# Patient Record
Sex: FEMALE | Race: WHITE | NOT HISPANIC OR LATINO | Employment: FULL TIME | URBAN - METROPOLITAN AREA
[De-identification: names, ages, dates, MRNs, and addresses within clinical notes are randomized per-mention and may not be internally consistent; named-entity substitution may affect disease eponyms.]

---

## 2021-03-24 ENCOUNTER — TRANSFERRED RECORDS (OUTPATIENT)
Dept: HEALTH INFORMATION MANAGEMENT | Facility: CLINIC | Age: 21
End: 2021-03-24

## 2022-02-16 ENCOUNTER — OFFICE VISIT (OUTPATIENT)
Dept: FAMILY MEDICINE | Facility: CLINIC | Age: 22
End: 2022-02-16
Payer: COMMERCIAL

## 2022-02-16 VITALS
RESPIRATION RATE: 14 BRPM | SYSTOLIC BLOOD PRESSURE: 115 MMHG | DIASTOLIC BLOOD PRESSURE: 77 MMHG | HEIGHT: 62 IN | HEART RATE: 88 BPM | WEIGHT: 98 LBS | BODY MASS INDEX: 18.03 KG/M2 | OXYGEN SATURATION: 100 % | TEMPERATURE: 97.6 F

## 2022-02-16 DIAGNOSIS — Z11.3 SCREENING FOR STDS (SEXUALLY TRANSMITTED DISEASES): ICD-10-CM

## 2022-02-16 DIAGNOSIS — N39.0 URINARY TRACT INFECTION WITH HEMATURIA, SITE UNSPECIFIED: Primary | ICD-10-CM

## 2022-02-16 DIAGNOSIS — B96.89 BV (BACTERIAL VAGINOSIS): ICD-10-CM

## 2022-02-16 DIAGNOSIS — N89.8 VAGINAL DISCHARGE: ICD-10-CM

## 2022-02-16 DIAGNOSIS — R31.9 URINARY TRACT INFECTION WITH HEMATURIA, SITE UNSPECIFIED: Primary | ICD-10-CM

## 2022-02-16 DIAGNOSIS — N76.0 BV (BACTERIAL VAGINOSIS): ICD-10-CM

## 2022-02-16 DIAGNOSIS — R10.2 PELVIC PAIN IN FEMALE: ICD-10-CM

## 2022-02-16 LAB
ALBUMIN UR-MCNC: 30 MG/DL
APPEARANCE UR: ABNORMAL
BACTERIA #/AREA URNS HPF: ABNORMAL /HPF
BILIRUB UR QL STRIP: NEGATIVE
CLUE CELLS: PRESENT
COLOR UR AUTO: YELLOW
GLUCOSE UR STRIP-MCNC: NEGATIVE MG/DL
HGB UR QL STRIP: ABNORMAL
KETONES UR STRIP-MCNC: ABNORMAL MG/DL
LEUKOCYTE ESTERASE UR QL STRIP: ABNORMAL
MUCOUS THREADS #/AREA URNS LPF: PRESENT /LPF
NITRATE UR QL: NEGATIVE
PH UR STRIP: 6.5 [PH] (ref 5–7)
RBC #/AREA URNS AUTO: ABNORMAL /HPF
SP GR UR STRIP: 1.02 (ref 1–1.03)
SQUAMOUS #/AREA URNS AUTO: ABNORMAL /LPF
TRICHOMONAS, WET PREP: ABNORMAL
UROBILINOGEN UR STRIP-ACNC: 1 E.U./DL
WBC #/AREA URNS AUTO: ABNORMAL /HPF
WBC'S/HIGH POWER FIELD, WET PREP: ABNORMAL
YEAST, WET PREP: ABNORMAL

## 2022-02-16 PROCEDURE — 81001 URINALYSIS AUTO W/SCOPE: CPT | Performed by: INTERNAL MEDICINE

## 2022-02-16 PROCEDURE — 87210 SMEAR WET MOUNT SALINE/INK: CPT | Performed by: INTERNAL MEDICINE

## 2022-02-16 PROCEDURE — 87491 CHLMYD TRACH DNA AMP PROBE: CPT | Performed by: INTERNAL MEDICINE

## 2022-02-16 PROCEDURE — 87591 N.GONORRHOEAE DNA AMP PROB: CPT | Performed by: INTERNAL MEDICINE

## 2022-02-16 PROCEDURE — 99204 OFFICE O/P NEW MOD 45 MIN: CPT | Performed by: INTERNAL MEDICINE

## 2022-02-16 PROCEDURE — 87086 URINE CULTURE/COLONY COUNT: CPT | Performed by: INTERNAL MEDICINE

## 2022-02-16 RX ORDER — METRONIDAZOLE 500 MG/1
500 TABLET ORAL 2 TIMES DAILY
Qty: 14 TABLET | Refills: 0 | Status: SHIPPED | OUTPATIENT
Start: 2022-02-16 | End: 2022-02-23

## 2022-02-16 RX ORDER — SULFAMETHOXAZOLE/TRIMETHOPRIM 800-160 MG
1 TABLET ORAL 2 TIMES DAILY
Qty: 14 TABLET | Refills: 0 | Status: SHIPPED | OUTPATIENT
Start: 2022-02-16 | End: 2022-09-15

## 2022-02-16 RX ORDER — NORGESTIMATE AND ETHINYL ESTRADIOL 7DAYSX3 LO
1 KIT ORAL DAILY
COMMUNITY
End: 2023-06-20

## 2022-02-16 ASSESSMENT — PAIN SCALES - GENERAL: PAINLEVEL: NO PAIN (0)

## 2022-02-16 ASSESSMENT — ENCOUNTER SYMPTOMS: ABDOMINAL PAIN: 1

## 2022-02-16 NOTE — PROGRESS NOTES
"Assessment and Plan  1. Pelvic pain in female  2. Urinary tract infection with hematuria, site unspecified  Ongoing problem of pelvic pain , Uncontrolled.  Intermittent sharp 8/10 sveerity 4x/ day - Mostly on left side but did have tenderness on suprapubic region on my exam today.. Pt is relocating from [place] and did have similar episode last year - Abdominal pain in 4/2021 when she had CT abdomen at ER there said her Uterus was abnormal shape as\"  heart shaped\"  Which is possibly \" Bicornuate Uterus \" per my knowledge. She states they Checked labs which was normal , with mild high WBC at that time. JAIEDN signed and will get the records.   - I do not suspect pregnancy as her LMP- 2/2/2022; On OCP since 14 yrs age which she religiously takes daily and patient denies any pregnancy at this time.   - Please see AVS for detaills before I send the symptomatic treatment of antispasmodic to her pharmacy. Pt understood and agreed with the plan.   UPDATE - Called and updated pt that her UA positive for infection and sent in antibiotic to her pharmacy. Will await for Urine culture ordered.   - UA with Microscopic reflex to Culture - lab collect; Future  - UA with Microscopic reflex to Culture - lab collect  - sulfamethoxazole-trimethoprim (BACTRIM DS) 800-160 MG tablet; Take 1 tablet by mouth 2 times daily  Dispense: 14 tablet; Refill: 0  - Urine Culture    2. Vaginal discharge  3. BV (bacterial vaginosis)  New problem, Wet prep positive for BV . Sent in medication to pharmacy.   - metroNIDAZOLE (FLAGYL) 500 MG tablet; Take 1 tablet (500 mg) by mouth 2 times daily for 7 days  Dispense: 14 tablet; Refill: 0  - Wet prep - lab collect; Future  - Wet prep - lab collect    3. Screening for STDs (sexually transmitted diseases)  - NEISSERIA GONORRHOEA PCR; Future  - CHLAMYDIA TRACHOMATIS PCR; Future  - NEISSERIA GONORRHOEA PCR  - CHLAMYDIA TRACHOMATIS PCR       Patient Instructions   As discussed, will check Urinalysis which if positive " for UTI and treat.     Will get your ER records from [place] before next steps.     ==================================    Patient Education     Pelvic Pain, Uncertain Cause    Pelvic pain is pain felt in the lowest part of the belly (abdomen) and between the hipbones. The pain may occur suddenly and recently (acute). Or the pain may last for 6 months or longer (chronic).   There are many possible causes of pelvic pain. The pain may be due to a problem in the female reproductive system. Or it may be due to a problem in the digestive, urinary, or musculoskeletal systems.   Based on your visit today, the exact cause of your pelvic pain is not certain. Your condition doesn't seem to be serious at this time. But it is important for you to keep watching for any new symptoms or worsening of your condition.   General care  Your healthcare provider may advise a number of ways to help manage your pain. These can include:     Taking over-the-counter pain medicine. Stronger pain medicine may also be prescribed, if needed.    Applying heat to the pelvic area. Use a heating pad or a hot pack. Taking a hot bath may also help.    Getting plenty of rest.    Making certain lifestyle changes. These can include practicing good posture and getting regular exercise. Studies have shown that these changes help reduce pelvic pain in some women.    Seeing a physical therapist or pain specialist. These healthcare providers can discuss other ways to manage pain with you.    Acupressure or acupuncture.  Follow-up care  Follow up with your healthcare provider, or as advised.    When to get medical advice  Call your healthcare provider right away if any of the following occur:     Fever of 100.4 F or higher, or as directed by your healthcare provider    Pain gets worse or you have sudden, severe pain or new pain    Nausea, vomiting, sweating, or restlessness    Dizziness or fainting    Abnormal vaginal discharge    Abnormal vaginal bleeding  (especially bleeding after menopause)  [employer] last reviewed this educational content on 6/1/2020 2000-2021 [employer]. All rights reserved. This information is not intended as a substitute for professional medical care. Always follow your healthcare professional's instructions.             Return in about 4 weeks (around 3/16/2022), or if symptoms worsen or fail to improve, for Preventative Visit , PAP Marcella Melendez MD  [facility] LUZ Ibanez is a 21 year old who presents for the following health issues       Abdominal Pain     History of Present Illness     Reason for visit:  Stomach pain  Symptom onset:  1-3 days ago  Symptoms include:  Pain in lower abdomen  Symptom intensity:  Moderate  Symptom progression:  Staying the same  Had these symptoms before:  Yes  Has tried/received treatment for these symptoms:  Yes  Previous treatment was successful:  No  What makes it worse:  Eating, walking  What makes it better:  Laying down    She eats 0-1 servings of fruits and vegetables daily.She consumes 0 sweetened beverage(s) daily.She exercises with enough effort to increase her heart rate 30 to 60 minutes per day.  She exercises with enough effort to increase her heart rate 3 or less days per week.   She is taking medications regularly.          Allergies   Allergen Reactions     Cefazolin Hives        History reviewed. No pertinent past medical history.    No past surgical history on file.    History reviewed. No pertinent family history.    Social History     Tobacco Use     Smoking status: Never Smoker     Smokeless tobacco: Never Used   Substance Use Topics     Alcohol use: Yes     Comment: 1x week        Current Outpatient Medications   Medication     norgestim-eth estrad triphasic (ORTHO TRI-CYCLEN LO) 0.18/0.215/0.25 MG-25 MCG tablet     No current facility-administered medications for this visit.        Review of Systems   Gastrointestinal:  "Positive for abdominal pain.      Constitutional, HEENT, cardiovascular, pulmonary, GI, , musculoskeletal, neuro, skin, endocrine and psych systems are negative, except as otherwise noted.      Objective    /77   Pulse 88   Temp 97.6  F (36.4  C) (Tympanic)   Resp 14   Ht 1.575 m (5' 2\")   Wt 44.5 kg (98 lb)   SpO2 100%   BMI 17.92 kg/m    Body mass index is 17.92 kg/m .  Physical Exam   GENERAL: healthy, alert and no distress  NECK: no adenopathy, no asymmetry, masses, or scars and thyroid normal to palpation  RESP: lungs clear to auscultation - no rales, rhonchi or wheezes  CV: regular rate and rhythm, normal S1 S2, no S3 or S4, no murmur, click or rub, no peripheral edema and peripheral pulses strong  ABDOMEN: soft, POSITIVE for suprapubic tenderness though she shows on and off subjective pains in left pelvic region , no hepatosplenomegaly, no masses and bowel sounds normal  MS: no gross musculoskeletal defects noted, no edema      "

## 2022-02-16 NOTE — PATIENT INSTRUCTIONS
As discussed, will check Urinalysis which if positive for UTI and treat.     Will get your ER records from [place] before next steps.     ==================================    Patient Education     Pelvic Pain, Uncertain Cause    Pelvic pain is pain felt in the lowest part of the belly (abdomen) and between the hipbones. The pain may occur suddenly and recently (acute). Or the pain may last for 6 months or longer (chronic).   There are many possible causes of pelvic pain. The pain may be due to a problem in the female reproductive system. Or it may be due to a problem in the digestive, urinary, or musculoskeletal systems.   Based on your visit today, the exact cause of your pelvic pain is not certain. Your condition doesn't seem to be serious at this time. But it is important for you to keep watching for any new symptoms or worsening of your condition.   General care  Your healthcare provider may advise a number of ways to help manage your pain. These can include:     Taking over-the-counter pain medicine. Stronger pain medicine may also be prescribed, if needed.    Applying heat to the pelvic area. Use a heating pad or a hot pack. Taking a hot bath may also help.    Getting plenty of rest.    Making certain lifestyle changes. These can include practicing good posture and getting regular exercise. Studies have shown that these changes help reduce pelvic pain in some women.    Seeing a physical therapist or pain specialist. These healthcare providers can discuss other ways to manage pain with you.    Acupressure or acupuncture.  Follow-up care  Follow up with your healthcare provider, or as advised.    When to get medical advice  Call your healthcare provider right away if any of the following occur:     Fever of 100.4 F or higher, or as directed by your healthcare provider    Pain gets worse or you have sudden, severe pain or new pain    Nausea, vomiting, sweating, or restlessness    Dizziness or fainting    Abnormal  vaginal discharge    Abnormal vaginal bleeding (especially bleeding after menopause)  [employer] last reviewed this educational content on 6/1/2020 2000-2021 [employer]. All rights reserved. This information is not intended as a substitute for professional medical care. Always follow your healthcare professional's instructions.

## 2022-02-17 ENCOUNTER — TELEPHONE (OUTPATIENT)
Dept: FAMILY MEDICINE | Facility: CLINIC | Age: 22
End: 2022-02-17
Payer: COMMERCIAL

## 2022-02-17 NOTE — RESULT ENCOUNTER NOTE
"Called patient and notified, Updated my progress notes. NO ACTION NEEDED..    Your Urine exam is positive for UTI. I have sent in antibiotic to your pharmacy. Please start it off ASAP. I will update on your culture results which takes few days. Please let me know if you have any questions.    Your Vaginal swab is positive for Bacterial Vaginosis, which is NOT a sexually transmitted disease. I have sent in antibiotic to your pharmacy. Please avoid alcohol while you are on this medication. You can find additional information regarding this condition as below. Let me know if you have any questions.   Mayra Melendez MD on 2021 at 6:55 PM    =================      Patient Education     Bacterial Vaginosis    You have a vaginal infection called bacterial vaginosis (BV). Both good and bad bacteria are present in a healthy vagina. BV occurs when these bacteria get out of balance. The number of bad bacteria increase. And the number of good bacteria decrease. BV is linked with sexual activity, but it's not a sexually transmitted infection (STI).   BV may or may not cause symptoms. If symptoms do occur, they can include:   Thin, gray, milky-white, or sometimes green discharge  Unpleasant odor or \"fishy\" smell  Itching, burning, or pain in or around the vagina  It is not known what causes BV, but certain factors can make the problem more likely. These can include:   Douching  Spermicides  Use of antibiotics  Change in hormone levels with pregnancy, breastfeeding, or menopause  Having sex with a new partner  Having sex with more than one partner  BV will sometimes go away on its own. But treatment is often advised. This is because untreated BV can raise the risk of more serious health problems such as:   Pelvic inflammatory disease (PID)   delivery (giving birth to a baby early if you're pregnant)  HIV and some other sexually transmitted infections (STIs)  Infection after surgery on the reproductive organs  Home " care  General care  BV is most often treated with medicines called antibiotics. These may be given as pills or as a vaginal cream. If antibiotics are prescribed, be sure to use them exactly as directed. And complete all of the medicine, even if your symptoms go away.  Don't douche or having sex during treatment.  If you have sex with a female partner, ask your healthcare provider if she should also be treated.  Prevention  Don't douche.  Don't have sex. If you do have sex, then take steps to lower your risk:  Use condoms when having sex.  Limit the number of sex partners you have.    Follow-up care  Follow up with your healthcare provider, or as advised.   When to get medical advice  Call your healthcare provider right away if:   You have a fever of 100.4 F (38 C) or higher, or as directed by your provider.  Your symptoms get worse, or they don't go away within a few days of starting treatment.  You have new pain in the lower belly or pelvic region.  You have side effects that bother you or a reaction to the pills or cream you're prescribed.  You or any of your sex partners have new symptoms, such as a rash, joint pain, or sores.  [employer] last reviewed this educational content on 6/1/2020 2000-2021 [employer]. All rights reserved. This information is not intended as a substitute for professional medical care. Always follow your healthcare professional's instructions.

## 2022-02-17 NOTE — TELEPHONE ENCOUNTER
"----- Message from Mayra Melendez MD sent at 2022 11:25 PM CST -----  Called patient and notified, Updated my progress notes. NO ACTION NEEDED..    Your Urine exam is positive for UTI. I have sent in antibiotic to your pharmacy. Please start it off ASAP. I will update on your culture results which takes few days. Please let me know if you have any questions.    Your Vaginal swab is positive for Bacterial Vaginosis, which is NOT a sexually transmitted disease. I have sent in antibiotic to your pharmacy. Please avoid alcohol while you are on this medication. You can find additional information regarding this condition as below. Let me know if you have any questions.   Mayra Melendez MD on 2021 at 6:55 PM    =================      Patient Education     Bacterial Vaginosis    You have a vaginal infection called bacterial vaginosis (BV). Both good and bad bacteria are present in a healthy vagina. BV occurs when these bacteria get out of balance. The number of bad bacteria increase. And the number of good bacteria decrease. BV is linked with sexual activity, but it's not a sexually transmitted infection (STI).   BV may or may not cause symptoms. If symptoms do occur, they can include:   Thin, gray, milky-white, or sometimes green discharge  Unpleasant odor or \"fishy\" smell  Itching, burning, or pain in or around the vagina  It is not known what causes BV, but certain factors can make the problem more likely. These can include:   Douching  Spermicides  Use of antibiotics  Change in hormone levels with pregnancy, breastfeeding, or menopause  Having sex with a new partner  Having sex with more than one partner  BV will sometimes go away on its own. But treatment is often advised. This is because untreated BV can raise the risk of more serious health problems such as:   Pelvic inflammatory disease (PID)   delivery (giving birth to a baby early if you're pregnant)  HIV and some other sexually transmitted " infections (STIs)  Infection after surgery on the reproductive organs  Home care  General care  BV is most often treated with medicines called antibiotics. These may be given as pills or as a vaginal cream. If antibiotics are prescribed, be sure to use them exactly as directed. And complete all of the medicine, even if your symptoms go away.  Don't douche or having sex during treatment.  If you have sex with a female partner, ask your healthcare provider if she should also be treated.  Prevention  Don't douche.  Don't have sex. If you do have sex, then take steps to lower your risk:  Use condoms when having sex.  Limit the number of sex partners you have.    Follow-up care  Follow up with your healthcare provider, or as advised.   When to get medical advice  Call your healthcare provider right away if:   You have a fever of 100.4 F (38 C) or higher, or as directed by your provider.  Your symptoms get worse, or they don't go away within a few days of starting treatment.  You have new pain in the lower belly or pelvic region.  You have side effects that bother you or a reaction to the pills or cream you're prescribed.  You or any of your sex partners have new symptoms, such as a rash, joint pain, or sores.  [employer] last reviewed this educational content on 6/1/2020 2000-2021 [employer]. All rights reserved. This information is not intended as a substitute for professional medical care. Always follow your healthcare professional's instructions.

## 2022-02-18 LAB
BACTERIA UR CULT: NORMAL
C TRACH DNA SPEC QL NAA+PROBE: NEGATIVE
N GONORRHOEA DNA SPEC QL NAA+PROBE: NEGATIVE

## 2022-04-03 ENCOUNTER — HEALTH MAINTENANCE LETTER (OUTPATIENT)
Age: 22
End: 2022-04-03

## 2022-09-15 ENCOUNTER — OFFICE VISIT (OUTPATIENT)
Dept: FAMILY MEDICINE | Facility: CLINIC | Age: 22
End: 2022-09-15
Payer: COMMERCIAL

## 2022-09-15 VITALS
DIASTOLIC BLOOD PRESSURE: 86 MMHG | HEART RATE: 101 BPM | SYSTOLIC BLOOD PRESSURE: 132 MMHG | WEIGHT: 100 LBS | BODY MASS INDEX: 18.29 KG/M2 | TEMPERATURE: 99.1 F | OXYGEN SATURATION: 97 %

## 2022-09-15 DIAGNOSIS — S01.332A COMPLICATION OF LEFT EAR PIERCING, INITIAL ENCOUNTER: Primary | ICD-10-CM

## 2022-09-15 PROCEDURE — 99213 OFFICE O/P EST LOW 20 MIN: CPT

## 2022-09-15 RX ORDER — DOXYCYCLINE 100 MG/1
100 CAPSULE ORAL 2 TIMES DAILY
Qty: 14 CAPSULE | Refills: 0 | Status: SHIPPED | OUTPATIENT
Start: 2022-09-15 | End: 2022-09-22

## 2022-09-15 ASSESSMENT — ENCOUNTER SYMPTOMS
FEVER: 0
WOUND: 1

## 2022-09-15 NOTE — PATIENT INSTRUCTIONS
Continue to irrigate the piercing with water or saline.  When the infection settles down remove the piercing.

## 2022-09-15 NOTE — PROGRESS NOTES
Assessment & Plan     Complication of left ear piercing, initial encounter    - doxycycline hyclate (VIBRAMYCIN) 100 MG capsule; Take 1 capsule (100 mg) by mouth 2 times daily for 7 days      15 minutes spent on the date of the encounter doing patient visit and documentation        See Patient Instructions    Return in about 1 week (around 9/22/2022).    [facility]  [facility]    Rosa Ibanez is a 21 year old presenting for the following health issues:  Ear Problem (Infected ear piercing, March ,behind left ear)      HPI     Infected ear piercing to the left earlobe for the past 1 to 2 weeks.  Piercing was placed 6 months ago.  No trauma to the ear.  Has been using hydrogen peroxide and water to the area.  Left earlobe piercing is swollen and draining yellow fluid.  No fevers.    Review of Systems   Constitutional: Negative for fever.   Skin: Positive for wound.            Objective    /86 (BP Location: Right arm, Patient Position: Sitting, Cuff Size: Adult Small)   Pulse 101   Temp 99.1  F (37.3  C) (Oral)   Wt 45.4 kg (100 lb)   SpO2 97%   BMI 18.29 kg/m    Body mass index is 18.29 kg/m .  Physical Exam   GENERAL: healthy, alert and no distress  SKIN: Left earlobe piercing with mild erythema and swelling.  Serous sanguinous drainage and possible new keloid to the posterior lobe

## 2022-10-03 ENCOUNTER — HEALTH MAINTENANCE LETTER (OUTPATIENT)
Age: 22
End: 2022-10-03

## 2023-04-25 ENCOUNTER — APPOINTMENT (OUTPATIENT)
Dept: URBAN - METROPOLITAN AREA CLINIC 254 | Age: 23
Setting detail: DERMATOLOGY
End: 2023-04-29

## 2023-04-25 VITALS — HEIGHT: 62 IN | WEIGHT: 99 LBS

## 2023-04-25 DIAGNOSIS — D22 MELANOCYTIC NEVI: ICD-10-CM

## 2023-04-25 PROBLEM — D22.22 MELANOCYTIC NEVI OF LEFT EAR AND EXTERNAL AURICULAR CANAL: Status: ACTIVE | Noted: 2023-04-25

## 2023-04-25 PROCEDURE — OTHER ADDITIONAL NOTES: OTHER

## 2023-04-25 PROCEDURE — 99202 OFFICE O/P NEW SF 15 MIN: CPT

## 2023-04-25 PROCEDURE — OTHER COUNSELING: OTHER

## 2023-04-25 PROCEDURE — OTHER MIPS QUALITY: OTHER

## 2023-04-25 ASSESSMENT — LOCATION DETAILED DESCRIPTION DERM: LOCATION DETAILED: LEFT POSTERIOR EAR

## 2023-04-25 ASSESSMENT — LOCATION SIMPLE DESCRIPTION DERM: LOCATION SIMPLE: LEFT EAR

## 2023-04-25 ASSESSMENT — LOCATION ZONE DERM: LOCATION ZONE: EAR

## 2023-04-25 NOTE — PROCEDURE: ADDITIONAL NOTES
Detail Level: Simple
Render Risk Assessment In Note?: no
Additional Notes: - Removed keratin buildup with a TCA and propyl alcohol for both ears. Patient has ears surgery 2 years ago. \\n- Recommended utilization of a cotton pad to clean the sites periodically.\\n- Will recheck it in 1 year for changes.

## 2023-04-25 NOTE — HPI: SKIN LESION
What Type Of Note Output Would You Prefer (Optional)?: Standard Output
How Severe Is Your Skin Lesion?: mild
Has Your Skin Lesion Been Treated?: not been treated
Is This A New Presentation, Or A Follow-Up?: Skin Lesion
Additional History: Mom noticed the lesion 2 months ago and wanted patient to get it check. Patient reported she has a history of precancerous moles that was biopsy before in the past. She never had to go back for a second treatment.

## 2023-05-20 ENCOUNTER — HEALTH MAINTENANCE LETTER (OUTPATIENT)
Age: 23
End: 2023-05-20

## 2023-06-17 ENCOUNTER — OFFICE VISIT (OUTPATIENT)
Dept: URGENT CARE | Facility: URGENT CARE | Age: 23
End: 2023-06-17
Payer: COMMERCIAL

## 2023-06-17 VITALS
BODY MASS INDEX: 19 KG/M2 | HEART RATE: 68 BPM | TEMPERATURE: 97.9 F | SYSTOLIC BLOOD PRESSURE: 127 MMHG | WEIGHT: 103.9 LBS | DIASTOLIC BLOOD PRESSURE: 87 MMHG | OXYGEN SATURATION: 98 % | RESPIRATION RATE: 16 BRPM

## 2023-06-17 DIAGNOSIS — N93.8 DUB (DYSFUNCTIONAL UTERINE BLEEDING): Primary | ICD-10-CM

## 2023-06-17 LAB
ALBUMIN SERPL BCG-MCNC: 4.8 G/DL (ref 3.5–5.2)
ALBUMIN UR-MCNC: ABNORMAL MG/DL
ALP SERPL-CCNC: 62 U/L (ref 35–104)
ALT SERPL W P-5'-P-CCNC: 18 U/L (ref 0–50)
ANION GAP SERPL CALCULATED.3IONS-SCNC: 12 MMOL/L (ref 7–15)
APPEARANCE UR: CLEAR
APTT PPP: 29 SECONDS (ref 22–38)
AST SERPL W P-5'-P-CCNC: 20 U/L (ref 0–45)
BACTERIA #/AREA URNS HPF: ABNORMAL /HPF
BASOPHILS # BLD AUTO: 0 10E3/UL (ref 0–0.2)
BASOPHILS NFR BLD AUTO: 0 %
BILIRUB SERPL-MCNC: 1.4 MG/DL
BILIRUB UR QL STRIP: NEGATIVE
BUN SERPL-MCNC: 14.9 MG/DL (ref 6–20)
CALCIUM SERPL-MCNC: 9.4 MG/DL (ref 8.6–10)
CHLORIDE SERPL-SCNC: 105 MMOL/L (ref 98–107)
COLOR UR AUTO: YELLOW
CREAT SERPL-MCNC: 0.8 MG/DL (ref 0.51–0.95)
DEPRECATED HCO3 PLAS-SCNC: 24 MMOL/L (ref 22–29)
EOSINOPHIL # BLD AUTO: 0.2 10E3/UL (ref 0–0.7)
EOSINOPHIL NFR BLD AUTO: 2 %
ERYTHROCYTE [DISTWIDTH] IN BLOOD BY AUTOMATED COUNT: 12.2 % (ref 10–15)
GFR SERPL CREATININE-BSD FRML MDRD: >90 ML/MIN/1.73M2
GLUCOSE SERPL-MCNC: 75 MG/DL (ref 70–99)
GLUCOSE UR STRIP-MCNC: NEGATIVE MG/DL
HCG UR QL: NEGATIVE
HCT VFR BLD AUTO: 43.4 % (ref 35–47)
HGB BLD-MCNC: 14.2 G/DL (ref 11.7–15.7)
HGB UR QL STRIP: ABNORMAL
IMM GRANULOCYTES # BLD: 0 10E3/UL
IMM GRANULOCYTES NFR BLD: 0 %
INR PPP: 0.99 (ref 0.85–1.15)
KETONES UR STRIP-MCNC: NEGATIVE MG/DL
LEUKOCYTE ESTERASE UR QL STRIP: ABNORMAL
LYMPHOCYTES # BLD AUTO: 2.7 10E3/UL (ref 0.8–5.3)
LYMPHOCYTES NFR BLD AUTO: 29 %
MCH RBC QN AUTO: 30.7 PG (ref 26.5–33)
MCHC RBC AUTO-ENTMCNC: 32.7 G/DL (ref 31.5–36.5)
MCV RBC AUTO: 94 FL (ref 78–100)
MONOCYTES # BLD AUTO: 0.5 10E3/UL (ref 0–1.3)
MONOCYTES NFR BLD AUTO: 6 %
NEUTROPHILS # BLD AUTO: 6 10E3/UL (ref 1.6–8.3)
NEUTROPHILS NFR BLD AUTO: 63 %
NITRATE UR QL: NEGATIVE
PH UR STRIP: 5.5 [PH] (ref 5–7)
PLATELET # BLD AUTO: 324 10E3/UL (ref 150–450)
POTASSIUM SERPL-SCNC: 4.1 MMOL/L (ref 3.4–5.3)
PROT SERPL-MCNC: 7.8 G/DL (ref 6.4–8.3)
RBC # BLD AUTO: 4.62 10E6/UL (ref 3.8–5.2)
RBC #/AREA URNS AUTO: ABNORMAL /HPF
SODIUM SERPL-SCNC: 141 MMOL/L (ref 136–145)
SP GR UR STRIP: 1.02 (ref 1–1.03)
SQUAMOUS #/AREA URNS AUTO: ABNORMAL /LPF
TSH SERPL DL<=0.005 MIU/L-ACNC: 1.3 UIU/ML (ref 0.3–4.2)
UROBILINOGEN UR STRIP-ACNC: 0.2 E.U./DL
WBC # BLD AUTO: 9.5 10E3/UL (ref 4–11)
WBC #/AREA URNS AUTO: ABNORMAL /HPF

## 2023-06-17 PROCEDURE — 80050 GENERAL HEALTH PANEL: CPT

## 2023-06-17 PROCEDURE — 87086 URINE CULTURE/COLONY COUNT: CPT | Performed by: PREVENTIVE MEDICINE

## 2023-06-17 PROCEDURE — 99214 OFFICE O/P EST MOD 30 MIN: CPT | Performed by: PREVENTIVE MEDICINE

## 2023-06-17 PROCEDURE — 85730 THROMBOPLASTIN TIME PARTIAL: CPT

## 2023-06-17 PROCEDURE — 36415 COLL VENOUS BLD VENIPUNCTURE: CPT

## 2023-06-17 PROCEDURE — 81025 URINE PREGNANCY TEST: CPT

## 2023-06-17 PROCEDURE — 81001 URINALYSIS AUTO W/SCOPE: CPT

## 2023-06-17 PROCEDURE — 85610 PROTHROMBIN TIME: CPT

## 2023-06-17 RX ORDER — NITROFURANTOIN 25; 75 MG/1; MG/1
100 CAPSULE ORAL 2 TIMES DAILY
Qty: 10 CAPSULE | Refills: 0 | Status: SHIPPED | OUTPATIENT
Start: 2023-06-17 | End: 2023-06-22

## 2023-06-19 LAB — BACTERIA UR CULT: NORMAL

## 2023-06-20 ENCOUNTER — OFFICE VISIT (OUTPATIENT)
Dept: PEDIATRICS | Facility: CLINIC | Age: 23
End: 2023-06-20
Payer: COMMERCIAL

## 2023-06-20 ENCOUNTER — TELEPHONE (OUTPATIENT)
Dept: OBGYN | Facility: CLINIC | Age: 23
End: 2023-06-20

## 2023-06-20 ENCOUNTER — HOSPITAL ENCOUNTER (OUTPATIENT)
Dept: ULTRASOUND IMAGING | Facility: CLINIC | Age: 23
Discharge: HOME | End: 2023-06-20
Attending: INTERNAL MEDICINE | Admitting: INTERNAL MEDICINE
Payer: COMMERCIAL

## 2023-06-20 VITALS
TEMPERATURE: 98.8 F | SYSTOLIC BLOOD PRESSURE: 123 MMHG | HEART RATE: 64 BPM | OXYGEN SATURATION: 99 % | DIASTOLIC BLOOD PRESSURE: 79 MMHG

## 2023-06-20 DIAGNOSIS — N92.6 IRREGULAR BLEEDING: Primary | ICD-10-CM

## 2023-06-20 DIAGNOSIS — N93.9 ABNORMAL UTERINE BLEEDING: Primary | ICD-10-CM

## 2023-06-20 DIAGNOSIS — N93.9 ABNORMAL UTERINE BLEEDING: ICD-10-CM

## 2023-06-20 DIAGNOSIS — Z30.011 ENCOUNTER FOR PRESCRIPTION OF ORAL CONTRACEPTIVES: ICD-10-CM

## 2023-06-20 PROCEDURE — 99213 OFFICE O/P EST LOW 20 MIN: CPT | Performed by: INTERNAL MEDICINE

## 2023-06-20 PROCEDURE — 76830 TRANSVAGINAL US NON-OB: CPT

## 2023-06-20 RX ORDER — NORGESTIMATE AND ETHINYL ESTRADIOL 7DAYSX3 LO
1 KIT ORAL DAILY
Qty: 84 TABLET | Refills: 0 | Status: SHIPPED | OUTPATIENT
Start: 2023-06-20 | End: 2023-07-17

## 2023-06-20 NOTE — PROGRESS NOTES
Assessment & Plan     1. DUB (dysfunctional uterine bleeding)  - CBC with platelets and differential  - INR  - Partial thromboplastin time  - TSH with free T4 reflex  - Comprehensive metabolic panel  - UA Microscopic with Reflex to Culture  - Urine Culture  - nitroFURantoin macrocrystal-monohydrate (MACROBID) 100 MG capsule; Take 1 capsule (100 mg) by mouth 2 times daily for 5 days  Dispense: 10 capsule; Refill: 0    Will treat for uti with macrobid  Patient also referred to [facility] for further evaluation and treatment of bleeding and pelvic ultrasound     41 minutes spent by me on the date of the encounter doing chart review, review of outside records, review of test results, interpretation of tests, patient visit, documentation and discussion with other provider(s)         No follow-ups on file.    Juan Pulido MD  [facility]    Subjective     Marcelle Barragan is a 22 year old year old female who presents to clinic today for the following health issues:    Patient presents with:  Vaginal Bleeding: X 3-4 weeks, spotting, light    This is a 21 yo female who has had vaginal bleeding for one month. She is on an OCP for this.  No pelvic pain.  Slight burning with urination.  No blood in stool or urine.  No abdominal pain, f/c/n/v/d/c.  Has occasional had spotting between periods in the past but not like this.  No vaginal discharge other than the bleeding.      Patient Active Problem List   Diagnosis     Pelvic pain in female       Current Outpatient Medications   Medication     nitroFURantoin macrocrystal-monohydrate (MACROBID) 100 MG capsule     norgestim-eth estrad triphasic (ORTHO TRI-CYCLEN LO) 0.18/0.215/0.25 MG-25 MCG tablet     No current facility-administered medications for this visit.       No past medical history on file.    Social History   reports that she has never smoked. She has never used smokeless tobacco. She reports current alcohol use. She reports that she does not  use drugs.    No family history on file.    Review of Systems  Constitutional, HEENT, cardiovascular, pulmonary, GI, , musculoskeletal, neuro, skin, endocrine and psych systems are negative, except as otherwise noted.      Objective    /87   Pulse 68   Temp 97.9  F (36.6  C) (Oral)   Resp 16   Wt 47.1 kg (103 lb 14.4 oz)   LMP 05/27/2023 (Exact Date)   SpO2 98%   Breastfeeding No   BMI 19.00 kg/m    Physical Exam   GENERAL: healthy, alert and no distress  EYES: Eyes grossly normal to inspection, PERRL and conjunctivae and sclerae normal  HENT: ear canals and TM's normal, nose and mouth without ulcers or lesions  NECK: no adenopathy, no asymmetry, masses, or scars and thyroid normal to palpation  RESP: lungs clear to auscultation - no rales, rhonchi or wheezes  CV: regular rate and rhythm, normal S1 S2, no S3 or S4, no murmur, click or rub, no peripheral edema and peripheral pulses strong  ABDOMEN: soft, nontender, no hepatosplenomegaly, no masses and bowel sounds normal  MS: no gross musculoskeletal defects noted, no edema  SKIN: no suspicious lesions or rashes  NEURO: Normal strength and tone, mentation intact and speech normal  PSYCH: mentation appears normal, affect normal/bright    Results for orders placed or performed in visit on 06/17/23   HCG qualitative urine     Status: Normal   Result Value Ref Range    hCG Urine Qualitative Negative Negative   UA with Microscopic reflex to Culture - Clinic Collect     Status: Abnormal    Specimen: Urine, Midstream   Result Value Ref Range    Color Urine Yellow Colorless, Straw, Light Yellow, Yellow    Appearance Urine Clear Clear    Glucose Urine Negative Negative mg/dL    Bilirubin Urine Negative Negative    Ketones Urine Negative Negative mg/dL    Specific Gravity Urine 1.025 1.003 - 1.035    Blood Urine Large (A) Negative    pH Urine 5.5 5.0 - 7.0    Protein Albumin Urine Trace (A) Negative mg/dL    Urobilinogen Urine 0.2 0.2, 1.0 E.U./dL    Nitrite  Urine Negative Negative    Leukocyte Esterase Urine Small (A) Negative   INR     Status: Normal   Result Value Ref Range    INR 0.99 0.85 - 1.15   Partial thromboplastin time     Status: Normal   Result Value Ref Range    aPTT 29 22 - 38 Seconds   TSH with free T4 reflex     Status: Normal   Result Value Ref Range    TSH 1.30 0.30 - 4.20 uIU/mL   Comprehensive metabolic panel     Status: Abnormal   Result Value Ref Range    Sodium 141 136 - 145 mmol/L    Potassium 4.1 3.4 - 5.3 mmol/L    Chloride 105 98 - 107 mmol/L    Carbon Dioxide (CO2) 24 22 - 29 mmol/L    Anion Gap 12 7 - 15 mmol/L    Urea Nitrogen 14.9 6.0 - 20.0 mg/dL    Creatinine 0.80 0.51 - 0.95 mg/dL    Calcium 9.4 8.6 - 10.0 mg/dL    Glucose 75 70 - 99 mg/dL    Alkaline Phosphatase 62 35 - 104 U/L    AST 20 0 - 45 U/L    ALT 18 0 - 50 U/L    Protein Total 7.8 6.4 - 8.3 g/dL    Albumin 4.8 3.5 - 5.2 g/dL    Bilirubin Total 1.4 (H) <=1.2 mg/dL    GFR Estimate >90 >60 mL/min/1.73m2   CBC with platelets and differential     Status: None   Result Value Ref Range    WBC Count 9.5 4.0 - 11.0 10e3/uL    RBC Count 4.62 3.80 - 5.20 10e6/uL    Hemoglobin 14.2 11.7 - 15.7 g/dL    Hematocrit 43.4 35.0 - 47.0 %    MCV 94 78 - 100 fL    MCH 30.7 26.5 - 33.0 pg    MCHC 32.7 31.5 - 36.5 g/dL    RDW 12.2 10.0 - 15.0 %    Platelet Count 324 150 - 450 10e3/uL    % Neutrophils 63 %    % Lymphocytes 29 %    % Monocytes 6 %    % Eosinophils 2 %    % Basophils 0 %    % Immature Granulocytes 0 %    Absolute Neutrophils 6.0 1.6 - 8.3 10e3/uL    Absolute Lymphocytes 2.7 0.8 - 5.3 10e3/uL    Absolute Monocytes 0.5 0.0 - 1.3 10e3/uL    Absolute Eosinophils 0.2 0.0 - 0.7 10e3/uL    Absolute Basophils 0.0 0.0 - 0.2 10e3/uL    Absolute Immature Granulocytes 0.0 <=0.4 10e3/uL   UA Microscopic with Reflex to Culture     Status: Abnormal   Result Value Ref Range    Bacteria Urine Moderate (A) None Seen /HPF    RBC Urine 2-5 (A) 0-2 /HPF /HPF    WBC Urine 10-25 (A) 0-5 /HPF /HPF     Squamous Epithelials Urine Moderate (A) None Seen /LPF   Urine Culture     Status: None    Specimen: Urine, Midstream   Result Value Ref Range    Culture 10,000-50,000 CFU/mL Mixture of urogenital carmen    CBC with platelets and differential     Status: None    Narrative    The following orders were created for panel order CBC with platelets and differential.  Procedure                               Abnormality         Status                     ---------                               -----------         ------                     CBC with platelets and d...[[id number]]                      Final result                 Please view results for these tests on the individual orders.

## 2023-06-20 NOTE — PROGRESS NOTES
ASSESSMENT:      1.  Irregular bleeding in patient on oral contraceptives  2.  Bicornate uterus, patient already aware        PLAN:  Discussed with [facility] for advisement.  1.  Hold birth control pills now, hope for withdrawal bleed  2.  Start next cycle of birth control pills in one week  3.  See Christen Hamilton on , as scheduled     Subjective   Marcelle is a 22 year old, presenting for the following health issues:  Vaginal Bleeding (Vaginal bleeding for about a month)         View : No data to display.              HPI     Menstrual Concern  Onset/Duration: about a month   Description:   Duration of bleeding episodes: 3 days  Frequency of periods: (1st day of one to 1st day of next):  every three weeks- on birth controll with placebo  Describe bleeding/flow:   Clots: No  Number of pads/day: spotting- bright red or dark        Cramping: No   Accompanying Signs & Symptoms:  Lightheadedness: No  Temperature intolerance: No  Nosebleeds/Easy bruising: No  Vaginal Discharge: No  Acne: No  Change in body hair: No  History:  Patient's last menstrual period was 2023 (exact date).     Contraceptive use: YES  Sexually active: YES  Any bleeding after intercourse: No  Abnormal PAP Smears: No  Precipitating or alleviating factors: None  Therapies tried and outcome: None    , not looking to get pregnant.  Patient has been on current birth control pills for many years.  No prior history of irregular menses or stopping.  Recently moved to [place], has only been seen once her in [place] by OB/GYN - not at [facility].   Has an appointment for 2023 at the [facility] clinic    Irregular bleeding just started on .   Prior menses was 2 months ago, normal without heavy bleeding.    Since , having irregular bleeding as least several days per week, none heavy.  No pain at all.  Sexually active  No other vaginal symptoms, discharge  No other bleeding issues, no recent illness.      Patient seen in urgent care on ,  referred to [facility] for pelvic ultrasound.  Started on macrobid for UTI due to abnormal UA, UC negative       Review of Systems   negative         Objective    /79 (BP Location: Left arm, Patient Position: Chair, Cuff Size: Adult Regular)   Pulse 64   Temp 98.8  F (37.1  C) (Oral)   LMP 05/27/2023 (Exact Date)   SpO2 99%   There is no height or weight on file to calculate BMI.  Physical Exam   GENERAL: healthy, alert and no distress  NECK: no adenopathy, no asymmetry, masses, or scars and thyroid normal to palpation  CV: regular rate and rhythm, normal S1 S2, no S3 or S4, no murmur, click or rub, no peripheral edema and peripheral pulses strong  ABDOMEN: soft, nontender, no hepatosplenomegaly, no masses and bowel sounds normal  MS: no gross musculoskeletal defects noted, no edema    See pelvic ultrasound report

## 2023-06-20 NOTE — TELEPHONE ENCOUNTER
Returning call to Dr Shin  Patient states that she has been spotting on/off for the past three weeks since 5/27. States her last regular period was one month prior to that.  Reports being a good pill taker, not missing any doses.  States she is three in on the pack on or around day 21. Rx not prescribed through our clinic, pt told Dr Shin that she takes in a continuous fashion.    6/17/23: HCG Negative    Appt with Lyndsay Wolf 7/17/23    Dr Shin seeking OB guidance. Recommending pt take the sugar pills this month to allow a bleed, hopefully this will allow her body to reset and resume pills as usual.  Follow-up in with Lyndsay as scheduled    Routing to provider to advise  Juana Jaramillo RN on 6/20/2023 at 4:45 PM

## 2023-06-20 NOTE — PATIENT INSTRUCTIONS
PLAN:    1.  Hold birth control pills now, hope for withdrawal bleed  2.  Start next cycle of birth control pills in one week  3.  See Christen Hamilton on 7/17, as scheduled

## 2023-06-20 NOTE — TELEPHONE ENCOUNTER
Subjective:       Patient ID:  Rosalia Fernández is a 67 y.o. female who presents for   Chief Complaint   Patient presents with    Spot     left back thigh     Last seen in January this year by Dr. Jacobs. Nothing biospied at that visit.   Presents today for 6 month skin check. Has a spot on back of left thigh that has a white discoloration and a sharp sting at times.      Also concerned about size of pores more noticeable below nose.  Has been using SONYA retinol a few times a week for at least year. Also uses SPF 50 daily.      Hx of melanoma (0.20 mm depth), 3 years ago, left lateral ankle, s/p WLE with skin graft repair- Dr Arguello, in [place].  Sister dx with breast cancer, July 2015. Denies family history melanoma or abnormal nevi.      History AKs, BCC left forehead 1/2015  History mildly atypical nevus left dorsal arm s/p excision 9/2014  moderately dysplastic nevus right lower abdomen s/p excision 2014.  Right lower calf moderate to severely DN s/p excision 3/2014  Mildly atypical nevus right calf, clear on biopsy 2/2014  moderate to focally severe atypical nevus left lateral leg s/p excision 2013  left shoulder mildly atypical excised 3/2013      Spot     desires refill metrogel for her rosacea    Review of Systems   Constitutional: Negative for weight loss, weight gain and night sweats.   Skin: Positive for daily sunscreen use and activity-related sunscreen use. Negative for wears hat.   Hematologic/Lymphatic: Does not bruise/bleed easily.        Objective:    Physical Exam   Constitutional: She appears well-developed and well-nourished. No distress.   Eyes: Lids are normal.    Cardiovascular: There is no local extremity swelling and no dependent edema.     Neurological: She is alert and oriented to person, place, and time. She is not disoriented.   Psychiatric: She has a normal mood and affect.   Skin:   Areas Examined (abnormalities noted in diagram):   Scalp / Hair Palpated and Inspected  Head / Face  Reason for call:  Other   Patient called regarding (reason for call): call back  Additional comments: Dr. Shin from the [facility] [address] called and asked to speak with someone regarding this patient's birth control medication and some symptoms, 3 weeks of irregular bleeding, she's been having. Please call back at [phone]    Phone number to reach patient:  Cell number on file:    Telephone Information:   Mobile [phone]     Best Time:  any    Can we leave a detailed message on this number?  Not Applicable    Travel screening: Not Applicable   Inspection Performed  Neck Inspection Performed  Chest / Axilla Inspection Performed  Abdomen Inspection Performed  Genitals / Buttocks / Groin Inspection Performed  Back Inspection Performed  RUE Inspected  LUE Inspection Performed  RLE Inspected  LLE Inspection Performed  Nails and Digits Inspection Performed                   Diagram Legend     Erythematous scaling macule/papule c/w actinic keratosis       Vascular papule c/w angioma      Pigmented verrucoid papule/plaque c/w seborrheic keratosis      Yellow umbilicated papule c/w sebaceous hyperplasia      Irregularly shaped tan macule c/w lentigo     1-2 mm smooth white papules consistent with Milia      Movable subcutaneous cyst with punctum c/w epidermal inclusion cyst      Subcutaneous movable cyst c/w pilar cyst      Firm pink to brown papule c/w dermatofibroma      Pedunculated fleshy papule(s) c/w skin tag(s)      Evenly pigmented macule c/w junctional nevus     Mildly variegated pigmented, slightly irregular-bordered macule c/w mildly atypical nevus      Flesh colored to evenly pigmented papule c/w intradermal nevus       Pink pearly papule/plaque c/w basal cell carcinoma      Erythematous hyperkeratotic cursted plaque c/w SCC      Surgical scar with no sign of skin cancer recurrence      Open and closed comedones      Inflammatory papules and pustules      Verrucoid papule consistent consistent with wart     Erythematous eczematous patches and plaques     Dystrophic onycholytic nail with subungual debris c/w onychomycosis     Umbilicated papule    Erythematous-base heme-crusted tan verrucoid plaque consistent with inflamed seborrheic keratosis     Erythematous Silvery Scaling Plaque c/w Psoriasis     See annotation      Assessment / Plan:        History of melanoma  Area of previous melanoma examined. Site well healed with no signs of recurrence.    Total body skin examination performed today including at least 12 points as noted in physical  examination. No lesions suspicious for malignancy noted.      History of atypical nevus  Area of previous atypical nevus examined. Site well healed with no signs of recurrence.    Total body skin examination performed today including at least 12 points as noted in physical examination. No lesions suspicious for malignancy noted.    Inflamed seborrheic keratosis, back  Cryosurgery procedure note:    Verbal consent from the patient is obtained. Liquid nitrogen cryosurgery is applied to 1 lesions to produce a freeze injury. The patient is aware that blisters may form and is instructed on wound care with gentle cleansing and use of vaseline ointment to keep moist until healed. The patient is supplied a handout on cryosurgery and is instructed to call if lesions do not completely resolve. Risk of dyspigmentation discussed.       Skin cancer screening  Upper body skin examination performed today including at least 6 points as noted in physical examination. No lesions suspicious for malignancy noted.        Rosacea  -     metronidazole 0.75% (METROCREAM) 0.75 % Crea; AAA face bid  Dispense: 60 g; Refill: 3    Ok to increase delmi retinol to nightly  Unable to tolerate retinoid         Follow-up in about 6 months (around 2/28/2019).

## 2023-06-20 NOTE — Clinical Note
Patient's pelvic ultrasound was unremarkable except for a bicornate uterus. OB suggested stopping BCP for a week, then resuming hopefully after a bleeding cycle. Thanks, Jim

## 2023-07-17 ENCOUNTER — OFFICE VISIT (OUTPATIENT)
Dept: OBGYN | Facility: CLINIC | Age: 23
End: 2023-07-17
Payer: COMMERCIAL

## 2023-07-17 VITALS
WEIGHT: 103.8 LBS | DIASTOLIC BLOOD PRESSURE: 62 MMHG | SYSTOLIC BLOOD PRESSURE: 96 MMHG | BODY MASS INDEX: 17.72 KG/M2 | HEIGHT: 64 IN

## 2023-07-17 DIAGNOSIS — Z11.4 SCREENING FOR HIV (HUMAN IMMUNODEFICIENCY VIRUS): ICD-10-CM

## 2023-07-17 DIAGNOSIS — Z12.4 SCREENING FOR CERVICAL CANCER: ICD-10-CM

## 2023-07-17 DIAGNOSIS — Z11.8 SCREENING FOR CHLAMYDIAL DISEASE: ICD-10-CM

## 2023-07-17 DIAGNOSIS — N63.0 BENIGN BREAST LUMPS: ICD-10-CM

## 2023-07-17 DIAGNOSIS — Z23 NEED FOR TDAP VACCINATION: ICD-10-CM

## 2023-07-17 DIAGNOSIS — Z01.419 ENCOUNTER FOR GYNECOLOGICAL EXAMINATION WITHOUT ABNORMAL FINDING: Primary | ICD-10-CM

## 2023-07-17 DIAGNOSIS — Z11.59 NEED FOR HEPATITIS C SCREENING TEST: ICD-10-CM

## 2023-07-17 DIAGNOSIS — Z30.011 ENCOUNTER FOR PRESCRIPTION OF ORAL CONTRACEPTIVES: ICD-10-CM

## 2023-07-17 DIAGNOSIS — Z11.3 SCREEN FOR SEXUALLY TRANSMITTED DISEASES: ICD-10-CM

## 2023-07-17 PROCEDURE — 99385 PREV VISIT NEW AGE 18-39: CPT | Mod: 25 | Performed by: NURSE PRACTITIONER

## 2023-07-17 PROCEDURE — G0145 SCR C/V CYTO,THINLAYER,RESCR: HCPCS | Performed by: NURSE PRACTITIONER

## 2023-07-17 PROCEDURE — 99213 OFFICE O/P EST LOW 20 MIN: CPT | Mod: 25 | Performed by: NURSE PRACTITIONER

## 2023-07-17 PROCEDURE — 90471 IMMUNIZATION ADMIN: CPT | Performed by: NURSE PRACTITIONER

## 2023-07-17 PROCEDURE — 87389 HIV-1 AG W/HIV-1&-2 AB AG IA: CPT | Performed by: NURSE PRACTITIONER

## 2023-07-17 PROCEDURE — 36415 COLL VENOUS BLD VENIPUNCTURE: CPT | Performed by: NURSE PRACTITIONER

## 2023-07-17 PROCEDURE — 87491 CHLMYD TRACH DNA AMP PROBE: CPT | Performed by: NURSE PRACTITIONER

## 2023-07-17 PROCEDURE — 86803 HEPATITIS C AB TEST: CPT | Performed by: NURSE PRACTITIONER

## 2023-07-17 PROCEDURE — 87591 N.GONORRHOEAE DNA AMP PROB: CPT | Performed by: NURSE PRACTITIONER

## 2023-07-17 PROCEDURE — 90715 TDAP VACCINE 7 YRS/> IM: CPT | Performed by: NURSE PRACTITIONER

## 2023-07-17 RX ORDER — NORGESTIMATE AND ETHINYL ESTRADIOL 7DAYSX3 LO
1 KIT ORAL DAILY
Qty: 84 TABLET | Refills: 4 | Status: SHIPPED | OUTPATIENT
Start: 2023-07-17 | End: 2024-08-06

## 2023-07-17 ASSESSMENT — ANXIETY QUESTIONNAIRES
GAD7 TOTAL SCORE: 0
7. FEELING AFRAID AS IF SOMETHING AWFUL MIGHT HAPPEN: NOT AT ALL
1. FEELING NERVOUS, ANXIOUS, OR ON EDGE: NOT AT ALL
IF YOU CHECKED OFF ANY PROBLEMS ON THIS QUESTIONNAIRE, HOW DIFFICULT HAVE THESE PROBLEMS MADE IT FOR YOU TO DO YOUR WORK, TAKE CARE OF THINGS AT HOME, OR GET ALONG WITH OTHER PEOPLE: NOT DIFFICULT AT ALL
6. BECOMING EASILY ANNOYED OR IRRITABLE: NOT AT ALL
GAD7 TOTAL SCORE: 0
3. WORRYING TOO MUCH ABOUT DIFFERENT THINGS: NOT AT ALL
5. BEING SO RESTLESS THAT IT IS HARD TO SIT STILL: NOT AT ALL
2. NOT BEING ABLE TO STOP OR CONTROL WORRYING: NOT AT ALL

## 2023-07-17 ASSESSMENT — PATIENT HEALTH QUESTIONNAIRE - PHQ9
SUM OF ALL RESPONSES TO PHQ QUESTIONS 1-9: 0
5. POOR APPETITE OR OVEREATING: NOT AT ALL

## 2023-07-17 NOTE — PROGRESS NOTES
Marcelle is a 22 year old No obstetric history on file. female who presents for annual exam.     Besides routine health maintenance, she has no other health concerns today .    HPI: here for annual exam today.  She takes OCP's continuous.  She states she rarely has any break thru bleeding on OCP's. She states her cycles were getting worse, heavier , dysmenorrhea, had headaches and nausea week of.   She is also wanting STD testing.    The patient's PCP is  [facility].        GYNECOLOGIC HISTORY:    Patient's last menstrual period was 05/27/2023 (exact date).    Regular menses? yes  Menses every 28 days.  Length of menses: 5 days    Her current contraception method is: oral contraceptives.  She  reports that she has never smoked. She has never used smokeless tobacco.  Patient is sexually active.  STD testing offered?  Accepted  Last PHQ-9 score on record =       7/17/2023     9:49 AM   PHQ-9 SCORE   PHQ-9 Total Score 0     Last GAD7 score on record =       7/17/2023     9:49 AM   VIKTORIA-7 SCORE   Total Score 0     Alcohol Score = 2    HEALTH MAINTENANCE:  Health maintenance updated:  yes  Overdue          today YEARLY PREVENTIVE VISIT (Yearly)     Never   Done ADVANCE CARE PLANNING (Every 5 Years)     today HIV SCREENING (Once)     today HEPATITIS C SCREENING (Once)     today PAP (Every 3 Years)     OCT 23   2021 COVID-19 Vaccine (3 - Pfizer series)  Last completed: Aug 28, 2021     today DTAP/TDAP/TD IMMUNIZATION (7 - Td or Tdap)   Last completed: Mar 21, 2012     today PHQ-2 (once per calendar year) (Yearly, January to December)  Last completed: Feb 16, 2022     today CHLAMYDIA SCREENING (Yearly)  Last completed: Feb 16, 2022        Upcoming          SEP 1   2023 INFLUENZA VACCINE (1)  Last completed: Nov 2, 2022       HISTORY:  OB History   No obstetric history on file.       Patient Active Problem List   Diagnosis     Pelvic pain in female     History reviewed. No pertinent surgical history.   Social  "History     Tobacco Use     Smoking status: Never     Smokeless tobacco: Never   Substance Use Topics     Alcohol use: Yes     Comment: 1x week      No data available            Current Outpatient Medications   Medication Sig     MULTIPLE VITAMIN PO      norgestim-eth estrad triphasic (ORTHO TRI-CYCLEN LO) 0.18/0.215/0.25 MG-25 MCG tablet Take 1 tablet by mouth daily NEW SIG:.  TAke 1 active tablet only by mouth daily     No current facility-administered medications for this visit.     Allergies   Allergen Reactions     Cefazolin Hives     Cefprozil Hives     Other reaction(s): Urticaria (Hives)       Past medical, surgical, social and family histories were reviewed and updated in EPIC.    ROS:   12 point review of systems negative other than symptoms noted below or in the HPI.  Normal menstrual cycles    EXAM:  BP 96/62   Ht 1.62 m (5' 3.78\")   Wt 47.1 kg (103 lb 12.8 oz)   LMP 05/27/2023 (Exact Date)   BMI 17.94 kg/m     BMI: Body mass index is 17.94 kg/m .    PHYSICAL EXAM:  Constitutional:   Appearance: Well nourished, well developed, alert, in no acute distress  Neck:  Lymph Nodes:  No lymphadenopathy present    Thyroid:  Gland size normal, nontender, no nodules or masses present  on palpation  Chest:  Respiratory Effort:  Breathing unlabored  Cardiovascular:    Heart: Auscultation:  Regular rate, normal rhythm, no murmurs present  Breasts: Right breast very prominent firm lump noted at 6 o'cl position and 12 o'cl.  Moveable. Axilla negative, no discharge or discoloration noted.  Left breast lump at 9 o'cl position.  Again very prominent, firm.  No discharge or discoloration noted.  Axilla negative.    Gastrointestinal:   Abdominal Examination:  Abdomen nontender to palpation, tone normal without rigidity or guarding, no masses present, umbilicus without lesions   Liver and Spleen:  No hepatomegaly present, liver nontender to palpation    Hernias:  No hernias present  Lymphatic: Lymph Nodes:  No other " lymphadenopathy present  Skin:  General Inspection:  No rashes present, no lesions present, no areas of  discoloration  Neurologic:    Mental Status:  Oriented X3.  Normal strength and tone, sensory exam                grossly normal, mentation intact and speech normal.    Psychiatric:   Mentation appears normal and affect normal/bright.         Pelvic Exam:  External Genitalia:     Normal appearance for age, no discharge present, no tenderness present, no inflammatory lesions present, color normal  Vagina:     Normal vaginal vault without central or paravaginal defects, no discharge present, no inflammatory lesions present, no masses present  GC/CT collected.   Bladder:     Nontender to palpation  Urethra:   Urethral Body:  Urethra palpation normal, urethra structural support normal   Urethral Meatus:  No erythema or lesions present  Cervix:     Appearance healthy, no lesions present, nontender to palpation, no bleeding present  Uterus:     Uterus: firm, normal sized and nontender, anteverted in position.   Adnexa:     No adnexal tenderness present, no adnexal masses present  Perineum:     Perineum within normal limits, no evidence of trauma, no rashes or skin lesions present  Anus:     Anus within normal limits, no hemorrhoids present  Inguinal Lymph Nodes:     No lymphadenopathy present  Pubic Hair:     Normal pubic hair distribution for age  Genitalia and Groin:     No rashes present, no lesions present, no areas of discoloration, no masses present      COUNSELING:   Reviewed preventive health counseling, as reflected in patient instructions       Regular exercise       Healthy diet/nutrition       Contraception       Safe sex practices/STD prevention    BMI: Body mass index is 17.94 kg/m .      ASSESSMENT:  22 year old female with satisfactory annual exam.    ICD-10-CM    1. Encounter for gynecological examination without abnormal finding  Z01.419       2. Encounter for prescription of oral contraceptives   Z30.011 norgestim-eth estrad triphasic (ORTHO TRI-CYCLEN LO) 0.18/0.215/0.25 MG-25 MCG tablet      3. Need for hepatitis C screening test  Z11.59 Hepatitis C antibody      4. Screening for cervical cancer  Z12.4       5. Screening for chlamydial disease  Z11.8 CHLAMYDIA TRACHOMATIS PCR      6. Screening for HIV (human immunodeficiency virus)  Z11.4 HIV Antigen Antibody Combo Cascade      7. Screen for sexually transmitted diseases  Z11.3 Pap thin layer screen reflex to HPV if ASCUS - recommended age 25 - 29 years     NEISSERIA GONORRHOEA PCR      8. Benign breast lumps  N63.0 US Breast Bilateral Complete 4 Quadrants          PLAN:  Normal Gyn exam.  Ok to continue OCP's for 1 year.  Will notify patient with lab results when available.  [facility]erred to [facility].  Also recommended consulting with Dr. Falguni Hernandez, regarding breast lumps.    Return 1 year for annual.  Pap every 3 years if normal.    Christen Hamilton, SHANNEN CNP

## 2023-07-18 LAB
C TRACH DNA SPEC QL NAA+PROBE: NEGATIVE
HCV AB SERPL QL IA: NONREACTIVE
HIV 1+2 AB+HIV1 P24 AG SERPL QL IA: NONREACTIVE
N GONORRHOEA DNA SPEC QL NAA+PROBE: NEGATIVE

## 2023-07-20 LAB
BKR LAB AP GYN ADEQUACY: NORMAL
BKR LAB AP GYN INTERPRETATION: NORMAL
BKR LAB AP HPV REFLEX: NORMAL
BKR LAB AP PREVIOUS ABNORMAL: NORMAL
PATH REPORT.COMMENTS IMP SPEC: NORMAL
PATH REPORT.COMMENTS IMP SPEC: NORMAL
PATH REPORT.RELEVANT HX SPEC: NORMAL

## 2023-07-21 ENCOUNTER — HOSPITAL ENCOUNTER (OUTPATIENT)
Dept: MAMMOGRAPHY | Facility: CLINIC | Age: 23
Discharge: HOME | End: 2023-07-21
Attending: NURSE PRACTITIONER
Payer: COMMERCIAL

## 2023-07-21 DIAGNOSIS — N63.0 BENIGN BREAST LUMPS: ICD-10-CM

## 2023-07-21 PROCEDURE — 76641 ULTRASOUND BREAST COMPLETE: CPT | Mod: 50

## 2023-07-28 ENCOUNTER — HOSPITAL ENCOUNTER (OUTPATIENT)
Dept: MAMMOGRAPHY | Facility: CLINIC | Age: 23
Discharge: HOME | End: 2023-07-28
Attending: NURSE PRACTITIONER
Payer: COMMERCIAL

## 2023-07-28 DIAGNOSIS — R92.8 ABNORMAL ULTRASOUND OF BREAST: ICD-10-CM

## 2023-07-28 DIAGNOSIS — N63.0 BENIGN BREAST LUMPS: ICD-10-CM

## 2023-07-28 PROCEDURE — 250N000009 HC RX 250: Performed by: RADIOLOGY

## 2023-07-28 PROCEDURE — A4648 IMPLANTABLE TISSUE MARKER: HCPCS

## 2023-07-28 PROCEDURE — 88305 TISSUE EXAM BY PATHOLOGIST: CPT | Mod: TC | Performed by: NURSE PRACTITIONER

## 2023-07-28 PROCEDURE — 19083 BX BREAST 1ST LESION US IMAG: CPT | Mod: 50

## 2023-07-28 RX ADMIN — LIDOCAINE HYDROCHLORIDE 15 ML: 10 INJECTION, SOLUTION EPIDURAL; INFILTRATION; INTRACAUDAL; PERINEURAL at 13:17

## 2023-07-28 NOTE — DISCHARGE INSTRUCTIONS

## 2023-07-28 NOTE — DISCHARGE INSTRUCTIONS

## 2023-07-28 NOTE — DISCHARGE INSTRUCTIONS

## 2023-07-31 ENCOUNTER — TELEPHONE (OUTPATIENT)
Dept: MAMMOGRAPHY | Facility: CLINIC | Age: 23
End: 2023-07-31
Payer: COMMERCIAL

## 2023-07-31 LAB
PATH REPORT.COMMENTS IMP SPEC: NORMAL
PATH REPORT.COMMENTS IMP SPEC: NORMAL
PATH REPORT.FINAL DX SPEC: NORMAL
PATH REPORT.GROSS SPEC: NORMAL
PATH REPORT.MICROSCOPIC SPEC OTHER STN: NORMAL
PATH REPORT.RELEVANT HX SPEC: NORMAL
PHOTO IMAGE: NORMAL

## 2023-07-31 PROCEDURE — 88305 TISSUE EXAM BY PATHOLOGIST: CPT | Mod: 26 | Performed by: PATHOLOGY

## 2023-07-31 NOTE — TELEPHONE ENCOUNTER
After review by Breast Center Radiologist, Dr. Yuliana SantiagoMarcelle was called,  verified, and given her 2023 Right and Left in two area Breast Biopsy results (Fibroadenomas in all three areas) and recommended Follow up (Clinical and a 6 month ultrasound follow up of a 4th area in the Right Breast,10:00).   Patient denies any concerns with the biopsy sites. Ordering provider was informed of the results and follow up plan.  I encouraged her to contact her doctor with any further breast concerns.  Patient verbalized understanding and agrees with the plan of care.        Marilia Villafuerte RN BSN  Procedure Nurse  [facility] [place]  [phone]    ------------------------------------------------------------------------------------------------------------------------------    [facility]  Marcelle Barragan [id number]  F, 2000  Surgical Pathology Report (Final result) [id number]  Authorizing Provider: Christen Hamilton APRN CNP  Ordering Provider: Christen Hamilton APRN CNP  Ordering Location: [facility]  Collected: 2023 01:26 PM  Pathologist: Danny Rouse MD Received: 2023 02:34 PM  .  Specimens  A Breast, Right  B Breast, Left  C Breast, Left  .  .  Final Diagnosis  A. Breast, right, 5:00, 5 cm from nipple: Ultrasound-guided core biopsy:  - Fibroadenoma, negative for atypia  B. Breast, left, 9:00, 1 cm from nipple: Ultrasound-guided core biopsy:  - Fibroadenoma, negative for atypia  C. Breast, left, 2:00, 4 cm from nipple: Ultrasound-guided core biopsy:  - Fibroadenoma, negative for atypia  Electronically signed by Danny Rouse MD on 2023 at 1:58 PM

## 2023-10-05 DIAGNOSIS — Z30.011 ENCOUNTER FOR PRESCRIPTION OF ORAL CONTRACEPTIVES: ICD-10-CM

## 2023-10-05 RX ORDER — NORGESTIMATE AND ETHINYL ESTRADIOL
KIT
Qty: 84 TABLET | Refills: 4 | OUTPATIENT
Start: 2023-10-05

## 2024-01-18 ENCOUNTER — TELEPHONE (OUTPATIENT)
Dept: OBGYN | Facility: CLINIC | Age: 24
End: 2024-01-18
Payer: COMMERCIAL

## 2024-01-18 DIAGNOSIS — R92.8 ABNORMAL ULTRASOUND OF BREAST: ICD-10-CM

## 2024-01-18 DIAGNOSIS — N63.0 BENIGN BREAST LUMPS: Primary | ICD-10-CM

## 2024-01-18 NOTE — TELEPHONE ENCOUNTER
Reason for call:  Other   Patient called regarding (reason for call): call back  Additional comments: previous Lyndsay S patient said she scheduled her US from the order for tomorrow but it's scheduled as a pelvic US when she needs a breast US order per Lyndsay's notes from her last appointment. Can we change the order?    Phone number to reach patient:  Cell number on file:    Telephone Information:   Mobile [phone]     Best Time:  any    Can we leave a detailed message on this number?  YES    Travel screening: Not Applicable

## 2024-01-18 NOTE — TELEPHONE ENCOUNTER
Pt needing Breast US and not Pelvic US  Breast US order placed.      7/17/23: Joy  PLAN:  Normal Gyn exam.  Ok to continue OCP's for 1 year.  Will notify patient with lab results when available.  Referred to [facility] for breast US.  Also recommended consulting with Dr. Falguni Hernandez, regarding breast lumps.      Pt updated that US order updated.  Will notify US location of order change.  Pt verbalized understanding, in agreement with plan, and voiced no further questions.  Juana Jaramillo RN on 1/18/2024 at 12:56 PM

## 2024-01-25 ENCOUNTER — HOSPITAL ENCOUNTER (OUTPATIENT)
Dept: MAMMOGRAPHY | Facility: CLINIC | Age: 24
Discharge: HOME | End: 2024-01-25
Attending: NURSE PRACTITIONER | Admitting: NURSE PRACTITIONER
Payer: COMMERCIAL

## 2024-01-25 DIAGNOSIS — N63.0 BENIGN BREAST LUMPS: ICD-10-CM

## 2024-01-25 DIAGNOSIS — R92.8 ABNORMAL ULTRASOUND OF BREAST: ICD-10-CM

## 2024-01-25 PROCEDURE — 76642 ULTRASOUND BREAST LIMITED: CPT | Mod: RT

## 2024-04-17 ENCOUNTER — OFFICE VISIT (OUTPATIENT)
Dept: FAMILY MEDICINE | Facility: CLINIC | Age: 24
End: 2024-04-17
Payer: COMMERCIAL

## 2024-04-17 ENCOUNTER — ORDERS ONLY (AUTO-RELEASED) (OUTPATIENT)
Dept: FAMILY MEDICINE | Facility: CLINIC | Age: 24
End: 2024-04-17
Payer: COMMERCIAL

## 2024-04-17 VITALS
OXYGEN SATURATION: 100 % | SYSTOLIC BLOOD PRESSURE: 114 MMHG | BODY MASS INDEX: 18.58 KG/M2 | RESPIRATION RATE: 16 BRPM | TEMPERATURE: 98.1 F | DIASTOLIC BLOOD PRESSURE: 79 MMHG | HEART RATE: 65 BPM | WEIGHT: 107.5 LBS

## 2024-04-17 DIAGNOSIS — E80.6 HYPERBILIRUBINEMIA: ICD-10-CM

## 2024-04-17 DIAGNOSIS — R00.2 PALPITATIONS: ICD-10-CM

## 2024-04-17 DIAGNOSIS — R00.2 PALPITATIONS: Primary | ICD-10-CM

## 2024-04-17 DIAGNOSIS — Z83.49 FH: THYROID DISEASE: ICD-10-CM

## 2024-04-17 DIAGNOSIS — R19.7 DIARRHEA, UNSPECIFIED TYPE: ICD-10-CM

## 2024-04-17 DIAGNOSIS — K58.0 IRRITABLE BOWEL SYNDROME WITH DIARRHEA: ICD-10-CM

## 2024-04-17 PROBLEM — K58.9 IBS (IRRITABLE BOWEL SYNDROME): Status: ACTIVE | Noted: 2023-06-01

## 2024-04-17 LAB
ERYTHROCYTE [DISTWIDTH] IN BLOOD BY AUTOMATED COUNT: 12.2 % (ref 10–15)
HCT VFR BLD AUTO: 38.3 % (ref 35–47)
HGB BLD-MCNC: 12.6 G/DL (ref 11.7–15.7)
MCH RBC QN AUTO: 30.4 PG (ref 26.5–33)
MCHC RBC AUTO-ENTMCNC: 32.9 G/DL (ref 31.5–36.5)
MCV RBC AUTO: 92 FL (ref 78–100)
PLATELET # BLD AUTO: 257 10E3/UL (ref 150–450)
RBC # BLD AUTO: 4.15 10E6/UL (ref 3.8–5.2)
WBC # BLD AUTO: 4.6 10E3/UL (ref 4–11)

## 2024-04-17 PROCEDURE — 83540 ASSAY OF IRON: CPT | Performed by: INTERNAL MEDICINE

## 2024-04-17 PROCEDURE — 83550 IRON BINDING TEST: CPT | Performed by: INTERNAL MEDICINE

## 2024-04-17 PROCEDURE — 36415 COLL VENOUS BLD VENIPUNCTURE: CPT | Performed by: INTERNAL MEDICINE

## 2024-04-17 PROCEDURE — 93000 ELECTROCARDIOGRAM COMPLETE: CPT | Performed by: INTERNAL MEDICINE

## 2024-04-17 PROCEDURE — 84443 ASSAY THYROID STIM HORMONE: CPT | Performed by: INTERNAL MEDICINE

## 2024-04-17 PROCEDURE — 85027 COMPLETE CBC AUTOMATED: CPT | Performed by: INTERNAL MEDICINE

## 2024-04-17 PROCEDURE — 99214 OFFICE O/P EST MOD 30 MIN: CPT | Performed by: INTERNAL MEDICINE

## 2024-04-17 PROCEDURE — 80053 COMPREHEN METABOLIC PANEL: CPT | Performed by: INTERNAL MEDICINE

## 2024-04-17 RX ORDER — CALCIUM POLYCARBOPHIL 625 MG
TABLET ORAL DAILY
COMMUNITY

## 2024-04-17 ASSESSMENT — ENCOUNTER SYMPTOMS
DIARRHEA: 1
ABDOMINAL PAIN: 1
NAUSEA: 1

## 2024-04-17 ASSESSMENT — PAIN SCALES - GENERAL: PAINLEVEL: NO PAIN (0)

## 2024-04-17 NOTE — PATIENT INSTRUCTIONS
"As discussed , please do pertinent work up needed for your current concerns.     Please hydrate well atleast 2 liters of water / day for improvement     Will place stool studies for making sure no infection and follow the diet below for improvement if Food is triggering your IBS before we consider further work up with GI      Characteristics and sources of common FODMAPs - AVOID     Word that corresponds to letter in acronym Compounds in this category Foods that contain these compounds   F Fermentable   O Oligosaccharides Fructans, galacto-oligosaccharides Wheat, barley, rye, onion, nemesio, white part of spring onion, garlic, shallots, artichokes, beetroot, fennel, peas, chicory, pistachio, cashews, legumes, lentils, and chickpeas   D Disaccharides Lactose Milk, custard, ice cream, and yogurt   M Monosaccharides \"Free fructose\" (fructose in excess of glucose) Apples, pears, mangoes, cherries, watermelon, asparagus, sugar snap peas, honey, high-fructose corn syrup   A And   P Polyols Sorbitol, mannitol, maltitol, and xylitol Apples, pears, apricots, cherries, nectarines, peaches, plums, watermelon, mushrooms, cauliflower, artificially sweetened chewing gum and confectionery   FODMAPs: fermentable oligosaccharides, disaccharides, monosaccharides, and polyols.  Adapted by permission from [employer]: American Journal of Gastroenterology. Lise SJ, Dennise MC, Ace [place]. Short-chain carbohydrates and functional gastrointestinal disorders. Am J Gastroenterol 2013; 108:707. Copyright   2013. www.nature.com/ajg.  Graphic 96478 Version 2.0    "

## 2024-04-17 NOTE — PROGRESS NOTES
Assessment and Plan  1. Palpitations    New problem, which is intermittent and currently no symptoms or signs per my physical exam today. As per HPI - Started 1 month back has been facing every 1-2 days, HR in 80's - 100's . Denies any stress or anxiety. Last episode yesterday. Differential diagnosis of dehydration, anxiety , thyroid or metabolic causes underlying which will be excluded .   - Shared decision for a Zio patch to get this set up to catch the abnormal rhythm happening during the palpitations. Pt understood and agreed with the plan.   - EKG 12-lead complete w/read - Clinics  - REVIEW OF HEALTH MAINTENANCE PROTOCOL ORDERS  - ZIO PATCH MAIL OUT; Future  - Comprehensive metabolic panel (BMP + Alb, Alk Phos, ALT, AST, Total. Bili, TP); Future  - TSH with free T4 reflex; Future  - CBC with platelets; Future  - Iron and iron binding capacity; Future  - Comprehensive metabolic panel (BMP + Alb, Alk Phos, ALT, AST, Total. Bili, TP)  - TSH with free T4 reflex  - CBC with platelets  - Iron and iron binding capacity    2. FH: thyroid disease  Both parents have Hypothyroidism    3. Irritable bowel syndrome with diarrhea  4. Diarrhea, unspecified type  New problem, which pt endorses that it happens as One episode every 2 weeks. More watery . Does endorses that its sometimes related to food. Will check for infectious causes, Diet modifications on the Low fodmap diet and give it some time for resolution with this plan. If no improvement , pt to follow up in Annual physical in 2-3 months for next steps. Pt understood and agreed with the plan.   - Enteric Bacteria and Virus Panel by [facility] Stool; Future  - Enteric Bacteria and Virus Panel by [facility] Stool    5. Hyperbilirubinemia  Possible dehydration versus [place] . Will check direct bilirubin as well.   - Bilirubin direct; Future       Suzetteeliana Gallegos is in the Office today . Answered all questions .     Please note that this note consists of symbols derived from keyboarding,  "dictation and/or voice recognition software. As a result, there may be errors in the script that have gone undetected. Please consider this when interpreting information found in this chart.    Patient Instructions   As discussed , please do pertinent work up needed for your current concerns.     Please hydrate well atleast 2 liters of water / day for improvement     Will place stool studies for making sure no infection and follow the diet below for improvement if Food is triggering your IBS before we consider further work up with GI      Characteristics and sources of common FODMAPs - AVOID     Word that corresponds to letter in acronym Compounds in this category Foods that contain these compounds   F Fermentable   O Oligosaccharides Fructans, galacto-oligosaccharides Wheat, barley, rye, onion, nemesio, white part of spring onion, garlic, shallots, artichokes, beetroot, fennel, peas, chicory, pistachio, cashews, legumes, lentils, and chickpeas   D Disaccharides Lactose Milk, custard, ice cream, and yogurt   M Monosaccharides \"Free fructose\" (fructose in excess of glucose) Apples, pears, mangoes, cherries, watermelon, asparagus, sugar snap peas, honey, high-fructose corn syrup   A And   P Polyols Sorbitol, mannitol, maltitol, and xylitol Apples, pears, apricots, cherries, nectarines, peaches, plums, watermelon, mushrooms, cauliflower, artificially sweetened chewing gum and confectionery   FODMAPs: fermentable oligosaccharides, disaccharides, monosaccharides, and polyols.  Adapted by permission from [employer]: American Journal of Gastroenterology. Lezama SJ, Loelvin MC, Ace [place]. Short-chain carbohydrates and functional gastrointestinal disorders. Am J Gastroenterol 2013; 108:707. Copyright   2013. www.nature.com/ajg.  Graphic 56425 Version 2.0    Return in about 2 months (around 6/17/2024), or if symptoms worsen or fail to improve, for video visit, If symptoms persist, Follow up of last " visit.    Mayra Melendez MD  [facility] LUZ Ibanez is a 23 year old, presenting for the following health issues:  Abdominal Pain (IBS, nausea, irregular bowel movements. ), Palpitations (Recent heart palpitations for a month now.), and LAB REQUEST (Thyroid check family history of thyroid issues. )        4/17/2024     9:15 AM   Additional Questions   Roomed by Hoa GOTTI   Accompanied by Mary     History of Present Illness       Reason for visit:  Heart palpitations, ibs, thryoid lab  Symptom onset:  More than a month  Symptoms include:  Heart palpitations, diarrhea, cramps, nausea, fatigue  Symptom intensity:  Moderate  Symptom progression:  Staying the same  Had these symptoms before:  Yes  What makes it worse:  No  What makes it better:  No    She eats 0-1 servings of fruits and vegetables daily.She consumes 0 sweetened beverage(s) daily.She exercises with enough effort to increase her heart rate 9 or less minutes per day.  She exercises with enough effort to increase her heart rate 3 or less days per week.   She is taking medications regularly.       Last seen patient in February 2020 for UTI concerns at that time, she is here for acute issues of new palpitations and needing workup on this.  Patient follows gynecology for her physicals.      Last lab work in June 2023 showing normal CMP except borderline hyperbilirubinemia possible Gilbert's, normal CBC and TSH at that time.  She also had positive UTI at that time.  Patient not on any medications.       Allergies   Allergen Reactions    Cefazolin Hives    Cefprozil Hives     Other reaction(s): Urticaria (Hives)        History reviewed. No pertinent past medical history.    Past Surgical History:   Procedure Laterality Date    OTOPLASTY      06/2021       Family History   Problem Relation Age of Onset    Hypertension Mother     Thyroid Disease Mother     Breast Cancer Maternal Grandmother     Ovarian Cancer Maternal  Grandmother        Social History     Tobacco Use    Smoking status: Never    Smokeless tobacco: Never   Substance Use Topics    Alcohol use: Yes     Comment: 1x week        Current Outpatient Medications   Medication Sig Dispense Refill    Calcium Polycarbophil (FIBER) 625 MG tablet Take by mouth daily      MULTIPLE VITAMIN PO       norgestim-eth estrad triphasic (ORTHO TRI-CYCLEN LO) 0.18/0.215/0.25 MG-25 MCG tablet Take 1 tablet by mouth daily NEW SIG:.  TAke 1 active tablet only by mouth daily 84 tablet 4     No current facility-administered medications for this visit.          Review of Systems  Constitutional, HEENT, cardiovascular, pulmonary, GI, , musculoskeletal, neuro, skin, endocrine and psych systems are negative, except as otherwise noted.      Objective    /79   Pulse 65   Temp 98.1  F (36.7  C) (Temporal)   Resp 16   Wt 48.8 kg (107 lb 8 oz)   LMP 04/03/2024 (Exact Date)   SpO2 100%   BMI 18.58 kg/m    Body mass index is 18.58 kg/m .  Physical Exam   GENERAL: alert and no distress  NECK: no adenopathy, no asymmetry, masses, or scars  RESP: lungs clear to auscultation - no rales, rhonchi or wheezes  CV: regular rate and rhythm, normal S1 S2, no S3 or S4, no murmur, click or rub, no peripheral edema  ABDOMEN: soft, nontender, no hepatosplenomegaly, no masses and bowel sounds normal  MS: no gross musculoskeletal defects noted, no edema      Signed Electronically by: Mayra Melendez MD

## 2024-04-18 LAB
ALBUMIN SERPL BCG-MCNC: 4.3 G/DL (ref 3.5–5.2)
ALP SERPL-CCNC: 61 U/L (ref 40–150)
ALT SERPL W P-5'-P-CCNC: 13 U/L (ref 0–50)
ANION GAP SERPL CALCULATED.3IONS-SCNC: 11 MMOL/L (ref 7–15)
AST SERPL W P-5'-P-CCNC: 23 U/L (ref 0–45)
BILIRUB SERPL-MCNC: 0.8 MG/DL
BUN SERPL-MCNC: 13.2 MG/DL (ref 6–20)
CALCIUM SERPL-MCNC: 9.2 MG/DL (ref 8.6–10)
CHLORIDE SERPL-SCNC: 103 MMOL/L (ref 98–107)
CREAT SERPL-MCNC: 0.73 MG/DL (ref 0.51–0.95)
DEPRECATED HCO3 PLAS-SCNC: 24 MMOL/L (ref 22–29)
EGFRCR SERPLBLD CKD-EPI 2021: >90 ML/MIN/1.73M2
GLUCOSE SERPL-MCNC: 80 MG/DL (ref 70–99)
IRON BINDING CAPACITY (ROCHE): 374 UG/DL (ref 240–430)
IRON SATN MFR SERPL: 29 % (ref 15–46)
IRON SERPL-MCNC: 107 UG/DL (ref 37–145)
POTASSIUM SERPL-SCNC: 4.9 MMOL/L (ref 3.4–5.3)
PROT SERPL-MCNC: 6.7 G/DL (ref 6.4–8.3)
SODIUM SERPL-SCNC: 138 MMOL/L (ref 135–145)
TSH SERPL DL<=0.005 MIU/L-ACNC: 1.17 UIU/ML (ref 0.3–4.2)

## 2024-05-04 PROCEDURE — 93244 EXT ECG>48HR<7D REV&INTERPJ: CPT | Performed by: INTERNAL MEDICINE

## 2024-06-17 ENCOUNTER — PATIENT OUTREACH (OUTPATIENT)
Dept: CARE COORDINATION | Facility: CLINIC | Age: 24
End: 2024-06-17
Payer: COMMERCIAL

## 2024-07-01 ENCOUNTER — PATIENT OUTREACH (OUTPATIENT)
Dept: CARE COORDINATION | Facility: CLINIC | Age: 24
End: 2024-07-01
Payer: COMMERCIAL

## 2024-08-04 SDOH — HEALTH STABILITY: PHYSICAL HEALTH: ON AVERAGE, HOW MANY MINUTES DO YOU ENGAGE IN EXERCISE AT THIS LEVEL?: 30 MIN

## 2024-08-04 SDOH — HEALTH STABILITY: PHYSICAL HEALTH: ON AVERAGE, HOW MANY DAYS PER WEEK DO YOU ENGAGE IN MODERATE TO STRENUOUS EXERCISE (LIKE A BRISK WALK)?: 1 DAY

## 2024-08-04 ASSESSMENT — SOCIAL DETERMINANTS OF HEALTH (SDOH): HOW OFTEN DO YOU GET TOGETHER WITH FRIENDS OR RELATIVES?: ONCE A WEEK

## 2024-08-06 ENCOUNTER — OFFICE VISIT (OUTPATIENT)
Dept: FAMILY MEDICINE | Facility: CLINIC | Age: 24
End: 2024-08-06
Attending: INTERNAL MEDICINE
Payer: COMMERCIAL

## 2024-08-06 ENCOUNTER — LAB (OUTPATIENT)
Dept: LAB | Facility: CLINIC | Age: 24
End: 2024-08-06
Payer: COMMERCIAL

## 2024-08-06 VITALS
RESPIRATION RATE: 16 BRPM | TEMPERATURE: 97.9 F | HEART RATE: 68 BPM | HEIGHT: 63 IN | DIASTOLIC BLOOD PRESSURE: 77 MMHG | BODY MASS INDEX: 18.55 KG/M2 | SYSTOLIC BLOOD PRESSURE: 108 MMHG | OXYGEN SATURATION: 100 % | WEIGHT: 104.7 LBS

## 2024-08-06 DIAGNOSIS — Z30.011 ENCOUNTER FOR PRESCRIPTION OF ORAL CONTRACEPTIVES: ICD-10-CM

## 2024-08-06 DIAGNOSIS — Z13.220 ENCOUNTER FOR SCREENING FOR LIPID DISORDER: ICD-10-CM

## 2024-08-06 DIAGNOSIS — R19.7 DIARRHEA, UNSPECIFIED TYPE: ICD-10-CM

## 2024-08-06 DIAGNOSIS — R00.2 PALPITATIONS: ICD-10-CM

## 2024-08-06 DIAGNOSIS — R19.7 DIARRHEA OF PRESUMED INFECTIOUS ORIGIN: Primary | ICD-10-CM

## 2024-08-06 DIAGNOSIS — K58.0 IRRITABLE BOWEL SYNDROME WITH DIARRHEA: ICD-10-CM

## 2024-08-06 DIAGNOSIS — Z00.00 ROUTINE GENERAL MEDICAL EXAMINATION AT A HEALTH CARE FACILITY: Primary | ICD-10-CM

## 2024-08-06 LAB
ADV 40+41 DNA STL QL NAA+NON-PROBE: NEGATIVE
ASTRO TYP 1-8 RNA STL QL NAA+NON-PROBE: NEGATIVE
C CAYETANENSIS DNA STL QL NAA+NON-PROBE: NEGATIVE
CAMPYLOBACTER DNA SPEC NAA+PROBE: NEGATIVE
CHOLEST SERPL-MCNC: 173 MG/DL
CRYPTOSP DNA STL QL NAA+NON-PROBE: NEGATIVE
E COLI O157 DNA STL QL NAA+NON-PROBE: NORMAL
E HISTOLYT DNA STL QL NAA+NON-PROBE: NEGATIVE
EAEC ASTA GENE ISLT QL NAA+PROBE: NEGATIVE
EC STX1+STX2 GENES STL QL NAA+NON-PROBE: NEGATIVE
EPEC EAE GENE STL QL NAA+NON-PROBE: NEGATIVE
ETEC LTA+ST1A+ST1B TOX ST NAA+NON-PROBE: NEGATIVE
FASTING STATUS PATIENT QL REPORTED: NORMAL
G LAMBLIA DNA STL QL NAA+NON-PROBE: NEGATIVE
HDLC SERPL-MCNC: 50 MG/DL
LDLC SERPL CALC-MCNC: 100 MG/DL
NONHDLC SERPL-MCNC: 123 MG/DL
NOROVIRUS GI+II RNA STL QL NAA+NON-PROBE: NEGATIVE
P SHIGELLOIDES DNA STL QL NAA+NON-PROBE: NEGATIVE
RVA RNA STL QL NAA+NON-PROBE: NEGATIVE
SALMONELLA SP RPOD STL QL NAA+PROBE: NEGATIVE
SAPO I+II+IV+V RNA STL QL NAA+NON-PROBE: NEGATIVE
SHIGELLA SP+EIEC IPAH ST NAA+NON-PROBE: NEGATIVE
TRIGL SERPL-MCNC: 113 MG/DL
V CHOLERAE DNA SPEC QL NAA+PROBE: NEGATIVE
VIBRIO DNA SPEC NAA+PROBE: NEGATIVE
Y ENTEROCOL DNA STL QL NAA+PROBE: NEGATIVE

## 2024-08-06 PROCEDURE — 99213 OFFICE O/P EST LOW 20 MIN: CPT | Mod: 25 | Performed by: INTERNAL MEDICINE

## 2024-08-06 PROCEDURE — 99395 PREV VISIT EST AGE 18-39: CPT | Performed by: INTERNAL MEDICINE

## 2024-08-06 PROCEDURE — 36415 COLL VENOUS BLD VENIPUNCTURE: CPT | Performed by: INTERNAL MEDICINE

## 2024-08-06 PROCEDURE — 87507 IADNA-DNA/RNA PROBE TQ 12-25: CPT

## 2024-08-06 PROCEDURE — 80061 LIPID PANEL: CPT | Performed by: INTERNAL MEDICINE

## 2024-08-06 RX ORDER — NORGESTIMATE AND ETHINYL ESTRADIOL 7DAYSX3 LO
1 KIT ORAL DAILY
Qty: 84 TABLET | Refills: 3 | Status: SHIPPED | OUTPATIENT
Start: 2024-08-06

## 2024-08-06 ASSESSMENT — ANXIETY QUESTIONNAIRES
GAD7 TOTAL SCORE: 0
1. FEELING NERVOUS, ANXIOUS, OR ON EDGE: NOT AT ALL
6. BECOMING EASILY ANNOYED OR IRRITABLE: NOT AT ALL
GAD7 TOTAL SCORE: 0
IF YOU CHECKED OFF ANY PROBLEMS ON THIS QUESTIONNAIRE, HOW DIFFICULT HAVE THESE PROBLEMS MADE IT FOR YOU TO DO YOUR WORK, TAKE CARE OF THINGS AT HOME, OR GET ALONG WITH OTHER PEOPLE: NOT DIFFICULT AT ALL
2. NOT BEING ABLE TO STOP OR CONTROL WORRYING: NOT AT ALL
3. WORRYING TOO MUCH ABOUT DIFFERENT THINGS: NOT AT ALL
5. BEING SO RESTLESS THAT IT IS HARD TO SIT STILL: NOT AT ALL
7. FEELING AFRAID AS IF SOMETHING AWFUL MIGHT HAPPEN: NOT AT ALL

## 2024-08-06 ASSESSMENT — PATIENT HEALTH QUESTIONNAIRE - PHQ9
5. POOR APPETITE OR OVEREATING: NOT AT ALL
SUM OF ALL RESPONSES TO PHQ QUESTIONS 1-9: 1

## 2024-08-06 ASSESSMENT — PAIN SCALES - GENERAL: PAINLEVEL: NO PAIN (0)

## 2024-08-06 NOTE — PROGRESS NOTES
Preventive Care Visit  [facility] LUZ Melendez MD, Internal Medicine  Aug 6, 2024        Assessment and Plan  1. Routine general medical examination at a health care facility    Last seen patient in April 2024 for acute concerns of palpitations, IBS and diarrhea for which all the workup was done including Zio patch was ordered at that time.      Patient Zio patch was normal, she was advised for hydration at that time.  Patient is here for annual physical.  All the labs done recently, can check lipid panel.    - PRIMARY CARE FOLLOW-UP SCHEDULING  - Lipid panel reflex to direct LDL Fasting; Future  - norgestim-eth estrad triphasic (ORTHO TRI-CYCLEN LO) 0.18/0.215/0.25 MG-25 MCG tablet; Take 1 tablet by mouth daily NEW SIG:.  TAke 1 active tablet only by mouth daily  Dispense: 84 tablet; Refill: 3  - PRIMARY CARE FOLLOW-UP SCHEDULING; Future  - Lipid panel reflex to direct LDL Fasting    2. Palpitations  Recent work up as mentioned above, emphasized on well hydration and avoiding diarrhea mentioned as below - Pt understood and agreed with the plan.     3. Encounter for prescription of oral contraceptives  Ongoing prescription from OBGYN , pt opting for PCP to take over from now on. Will do as requested.   - norgestim-eth estrad triphasic (ORTHO TRI-CYCLEN LO) 0.18/0.215/0.25 MG-25 MCG tablet; Take 1 tablet by mouth daily NEW SIG:.  TAke 1 active tablet only by mouth daily  Dispense: 84 tablet; Refill: 3    4. Irritable bowel syndrome with diarrhea  5. Diarrhea, unspecified type  Chronic problem, pt has already seen GI in the past and was recommended diet management as she endorses. Discussed on LOWFODMAP diet as mentioned on pt AVS below, answered all the questions.     6. Encounter for screening for lipid disorder  - Lipid panel reflex to direct LDL Fasting; Future  - Lipid panel reflex to direct LDL Fasting         Please note that this note consists of symbols derived from  "keyboarding, dictation and/or voice recognition software. As a result, there may be errors in the script that have gone undetected. Please consider this when interpreting information found in this chart.    Patient Instructions   As discussed, please do fasting labs placed    Refills done    Please follow the low FODMAP diet below.     ======================    Characteristics and sources of common FODMAPs - AVOID THESE FOODS      Word that corresponds to letter in acronym Compounds in this category Foods that contain these compounds   F Fermentable   O Oligosaccharides Fructans, galacto-oligosaccharides Wheat, barley, rye, onion, nemesio, white part of spring onion, garlic, shallots, artichokes, beetroot, fennel, peas, chicory, pistachio, cashews, legumes, lentils, and chickpeas   D Disaccharides Lactose Milk, custard, ice cream, and yogurt   M Monosaccharides \"Free fructose\" (fructose in excess of glucose) Apples, pears, mangoes, cherries, watermelon, asparagus, sugar snap peas, honey, high-fructose corn syrup   A And   P Polyols Sorbitol, mannitol, maltitol, and xylitol Apples, pears, apricots, cherries, nectarines, peaches, plums, watermelon, mushrooms, cauliflower, artificially sweetened chewing gum and confectionery   FODMAPs: fermentable oligosaccharides, disaccharides, monosaccharides, and polyols.  Adapted by permission from [employer]: American Journal of Gastroenterology. Lezama SJ, Loelvin MC, [employer]. Short-chain carbohydrates and functional gastrointestinal disorders. Am J Gastroenterol 2013; 108:707. Copyright   2013. www.nature.com/ajg.  Graphic 10949 Version 2.0    =========================        Patient Education  Preventive Care Advice   This is general advice given by our system to help you stay healthy. However, your care team may have specific advice just for you. Please talk to your care team about your preventive care needs.  Nutrition  Eat 5 or more servings of fruits and " vegetables each day.  Try wheat bread, brown rice and whole grain pasta (instead of white bread, rice, and pasta).  Get enough calcium and vitamin D. Check the label on foods and aim for 100% of the RDA (recommended daily allowance).  Lifestyle  Exercise at least 150 minutes each week  (30 minutes a day, 5 days a week).  Do muscle strengthening activities 2 days a week. These help control your weight and prevent disease.  No smoking.  Wear sunscreen to prevent skin cancer.  Have a dental exam and cleaning every 6 months.  Yearly exams  See your health care team every year to talk about:  Any changes in your health.  Any medicines your care team has prescribed.  Preventive care, family planning, and ways to prevent chronic diseases.  Shots (vaccines)   HPV shots (up to age 26), if you've never had them before.  Hepatitis B shots (up to age 59), if you've never had them before.  COVID-19 shot: Get this shot when it's due.  Flu shot: Get a flu shot every year.  Tetanus shot: Get a tetanus shot every 10 years.  Pneumococcal, hepatitis A, and RSV shots: Ask your care team if you need these based on your risk.  Shingles shot (for age 50 and up)  General health tests  Diabetes screening:  Starting at age 35, Get screened for diabetes at least every 3 years.  If you are younger than age 35, ask your care team if you should be screened for diabetes.  Cholesterol test: At age 39, start having a cholesterol test every 5 years, or more often if advised.  Bone density scan (DEXA): At age 50, ask your care team if you should have this scan for osteoporosis (brittle bones).  Hepatitis C: Get tested at least once in your life.  STIs (sexually transmitted infections)  Before age 24: Ask your care team if you should be screened for STIs.  After age 24: Get screened for STIs if you're at risk. You are at risk for STIs (including HIV) if:  You are sexually active with more than one person.  You don't use condoms every time.  You or a  partner was diagnosed with a sexually transmitted infection.  If you are at risk for HIV, ask about PrEP medicine to prevent HIV.  Get tested for HIV at least once in your life, whether you are at risk for HIV or not.  Cancer screening tests  Cervical cancer screening: If you have a cervix, begin getting regular cervical cancer screening tests starting at age 21.  Breast cancer scan (mammogram): If you've ever had breasts, begin having regular mammograms starting at age 40. This is a scan to check for breast cancer.  Colon cancer screening: It is important to start screening for colon cancer at age 45.  Have a colonoscopy test every 10 years (or more often if you're at risk) Or, ask your provider about stool tests like a FIT test every year or Cologuard test every 3 years.  To learn more about your testing options, visit:   .  For help making a decision, visit:   https://bit.ly/qw91753.  Prostate cancer screening test: If you have a prostate, ask your care team if a prostate cancer screening test (PSA) at age 55 is right for you.  Lung cancer screening: If you are a current or former smoker ages 50 to 80, ask your care team if ongoing lung cancer screenings are right for you.  For informational purposes only. Not to replace the advice of your health care provider. Copyright   2023 [facility]. All rights reserved. Clinically reviewed by the [facility] Transitions Program. [employer] 298979 - REV 01/24.     Return in about 1 year (around 8/6/2025), or if symptoms worsen or fail to improve, for Follow up of last visit, If symptoms persist, Preventative Visit.    Mayra Melendez MD  [facility] LUZ Ibanez is a 23 year old, presenting for the following:  Physical (Fasting. )        8/6/2024    10:49 AM   Additional Questions   Roomed by Hoa GOTTI        Health Care Directive  Patient does not have a Health Care Directive or Living Will: Discussed advance  care planning with patient; however, patient declined at this time.    HPI        8/4/2024   General Health   How would you rate your overall physical health? Excellent   Feel stress (tense, anxious, or unable to sleep) Not at all            8/4/2024   Nutrition   Three or more servings of calcium each day? Yes   Diet: Regular (no restrictions)   How many servings of fruit and vegetables per day? (!) 0-1   How many sweetened beverages each day? 0-1            8/4/2024   Exercise   Days per week of moderate/strenous exercise 1 day   Average minutes spent exercising at this level 30 min      (!) EXERCISE CONCERN      8/4/2024   Social Factors   Frequency of gathering with friends or relatives Once a week   Worry food won't last until get money to buy more No   Food not last or not have enough money for food? No   Do you have housing? (Housing is defined as stable permanent housing and does not include staying ouside in a car, in a tent, in an abandoned building, in an overnight shelter, or couch-surfing.) Yes   Are you worried about losing your housing? No   Lack of transportation? No   Unable to get utilities (heat,electricity)? No            8/4/2024   Dental   Dentist two times every year? Yes            8/4/2024   TB Screening   Were you born outside of the [place]? No              Today's PHQ-2 Score:       4/17/2024     9:02 AM   PHQ-2 ( 1999 [employer])   Q1: Little interest or pleasure in doing things 1   Q2: Feeling down, depressed or hopeless 0   PHQ-2 Score 1   Q1: Little interest or pleasure in doing things Several days   Q2: Feeling down, depressed or hopeless Not at all   PHQ-2 Score 1         8/4/2024   Substance Use   Alcohol more than 3/day or more than 7/wk No   Do you use any other substances recreationally? No        Social History     Tobacco Use    Smoking status: Never    Smokeless tobacco: Never   Vaping Use    Vaping status: Never Used   Substance Use Topics    Alcohol use: Yes     Comment: 1x week     Drug use: Never           7/21/2023   LAST FHS-7 RESULTS   1st degree relative breast or ovarian cancer No   Any relative bilateral breast cancer No   Any male have breast cancer No   Any ONE woman have BOTH breast AND ovarian cancer No   Any woman with breast cancer before 50yrs No   2 or more relatives with breast AND/OR ovarian cancer Yes   2 or more relatives with breast AND/OR bowel cancer No           Mammogram Screening - Patient under 40 years of age: Routine Mammogram Screening not recommended.         8/4/2024   STI Screening   New sexual partner(s) since last STI/HIV test? No        History of abnormal Pap smear: No - age 21-29 PAP every 3 years recommended        7/17/2023     9:57 AM   PAP / HPV   PAP Negative for Intraepithelial Lesion or Malignancy (NILM)            8/4/2024   Contraception/Family Planning   Questions about contraception or family planning No           Reviewed and updated as needed this visit by Provider                    History reviewed. No pertinent past medical history.  Past Surgical History:   Procedure Laterality Date    OTOPLASTY      06/2021     OB History   No obstetric history on file.     Lab work is in process  Labs reviewed in EPIC  BP Readings from Last 3 Encounters:   08/06/24 108/77   04/17/24 114/79   07/17/23 96/62    Wt Readings from Last 3 Encounters:   08/06/24 47.5 kg (104 lb 11.2 oz)   04/17/24 48.8 kg (107 lb 8 oz)   07/17/23 47.1 kg (103 lb 12.8 oz)                  Patient Active Problem List   Diagnosis    Pelvic pain in female    Heart palpitations    IBS (irritable bowel syndrome)     Past Surgical History:   Procedure Laterality Date    OTOPLASTY      06/2021       Social History     Tobacco Use    Smoking status: Never    Smokeless tobacco: Never   Substance Use Topics    Alcohol use: Yes     Comment: 1x week     Family History   Problem Relation Age of Onset    Hypertension Mother     Thyroid Disease Mother     Breast Cancer Maternal Grandmother      "Ovarian Cancer Maternal Grandmother     Colon Cancer Father     Asthma Father          Current Outpatient Medications   Medication Sig Dispense Refill    Calcium Polycarbophil (FIBER) 625 MG tablet Take by mouth daily      MULTIPLE VITAMIN PO       norgestim-eth estrad triphasic (ORTHO TRI-CYCLEN LO) 0.18/0.215/0.25 MG-25 MCG tablet Take 1 tablet by mouth daily NEW SIG:.  TAke 1 active tablet only by mouth daily 84 tablet 3     Allergies   Allergen Reactions    Cefazolin Hives    Cefprozil Hives     Other reaction(s): Urticaria (Hives)     Recent Labs   Lab Test 04/17/24  1001 06/17/23  1133   ALT 13 18   CR 0.73 0.80   GFRESTIMATED >90 >90   POTASSIUM 4.9 4.1   TSH 1.17 1.30          Review of Systems  Constitutional, HEENT, cardiovascular, pulmonary, GI, , musculoskeletal, neuro, skin, endocrine and psych systems are negative, except as otherwise noted.     Objective    Exam  /77   Pulse 68   Temp 97.9  F (36.6  C) (Temporal)   Resp 16   Ht 1.6 m (5' 2.99\")   Wt 47.5 kg (104 lb 11.2 oz)   LMP 07/24/2024 (Exact Date)   SpO2 100%   BMI 18.55 kg/m     Estimated body mass index is 18.55 kg/m  as calculated from the following:    Height as of this encounter: 1.6 m (5' 2.99\").    Weight as of this encounter: 47.5 kg (104 lb 11.2 oz).    Physical Exam  GENERAL: alert and no distress  EYES: Eyes grossly normal to inspection, PERRL and conjunctivae and sclerae normal  HENT: ear canals and TM's normal, nose and mouth without ulcers or lesions  NECK: no adenopathy, no asymmetry, masses, or scars  RESP: lungs clear to auscultation - no rales, rhonchi or wheezes  BREAST: normal without masses, tenderness or nipple discharge and no palpable axillary masses or adenopathy  CV: regular rate and rhythm, normal S1 S2, no S3 or S4, no murmur, click or rub, no peripheral edema  ABDOMEN: soft, nontender, no hepatosplenomegaly, no masses and bowel sounds normal  MS: no gross musculoskeletal defects noted, no edema  SKIN: " no suspicious lesions or rashes  NEURO: Normal strength and tone, mentation intact and speech normal  PSYCH: mentation appears normal, affect normal/bright        Signed Electronically by: Mayra Melendez MD

## 2024-08-06 NOTE — PATIENT INSTRUCTIONS
"As discussed, please do fasting labs placed    Refills done    Please follow the low FODMAP diet below.     ======================    Characteristics and sources of common FODMAPs - AVOID THESE FOODS      Word that corresponds to letter in acronym Compounds in this category Foods that contain these compounds   F Fermentable   O Oligosaccharides Fructans, galacto-oligosaccharides Wheat, barley, rye, onion, nemesio, white part of spring onion, garlic, shallots, artichokes, beetroot, fennel, peas, chicory, pistachio, cashews, legumes, lentils, and chickpeas   D Disaccharides Lactose Milk, custard, ice cream, and yogurt   M Monosaccharides \"Free fructose\" (fructose in excess of glucose) Apples, pears, mangoes, cherries, watermelon, asparagus, sugar snap peas, honey, high-fructose corn syrup   A And   P Polyols Sorbitol, mannitol, maltitol, and xylitol Apples, pears, apricots, cherries, nectarines, peaches, plums, watermelon, mushrooms, cauliflower, artificially sweetened chewing gum and confectionery   FODMAPs: fermentable oligosaccharides, disaccharides, monosaccharides, and polyols.  Adapted by permission from [employer]: American Journal of Gastroenterology. Lezama SJ, Dennise MC, [employer]. Short-chain carbohydrates and functional gastrointestinal disorders. Am J Gastroenterol 2013; 108:707. Copyright   2013. www.nature.com/ajg.  Graphic 50141 Version 2.0    =========================        Patient Education   Preventive Care Advice   This is general advice given by our system to help you stay healthy. However, your care team may have specific advice just for you. Please talk to your care team about your preventive care needs.  Nutrition  Eat 5 or more servings of fruits and vegetables each day.  Try wheat bread, brown rice and whole grain pasta (instead of white bread, rice, and pasta).  Get enough calcium and vitamin D. Check the label on foods and aim for 100% of the RDA (recommended daily " allowance).  Lifestyle  Exercise at least 150 minutes each week  (30 minutes a day, 5 days a week).  Do muscle strengthening activities 2 days a week. These help control your weight and prevent disease.  No smoking.  Wear sunscreen to prevent skin cancer.  Have a dental exam and cleaning every 6 months.  Yearly exams  See your health care team every year to talk about:  Any changes in your health.  Any medicines your care team has prescribed.  Preventive care, family planning, and ways to prevent chronic diseases.  Shots (vaccines)   HPV shots (up to age 26), if you've never had them before.  Hepatitis B shots (up to age 59), if you've never had them before.  COVID-19 shot: Get this shot when it's due.  Flu shot: Get a flu shot every year.  Tetanus shot: Get a tetanus shot every 10 years.  Pneumococcal, hepatitis A, and RSV shots: Ask your care team if you need these based on your risk.  Shingles shot (for age 50 and up)  General health tests  Diabetes screening:  Starting at age 35, Get screened for diabetes at least every 3 years.  If you are younger than age 35, ask your care team if you should be screened for diabetes.  Cholesterol test: At age 39, start having a cholesterol test every 5 years, or more often if advised.  Bone density scan (DEXA): At age 50, ask your care team if you should have this scan for osteoporosis (brittle bones).  Hepatitis C: Get tested at least once in your life.  STIs (sexually transmitted infections)  Before age 24: Ask your care team if you should be screened for STIs.  After age 24: Get screened for STIs if you're at risk. You are at risk for STIs (including HIV) if:  You are sexually active with more than one person.  You don't use condoms every time.  You or a partner was diagnosed with a sexually transmitted infection.  If you are at risk for HIV, ask about PrEP medicine to prevent HIV.  Get tested for HIV at least once in your life, whether you are at risk for HIV or  not.  Cancer screening tests  Cervical cancer screening: If you have a cervix, begin getting regular cervical cancer screening tests starting at age 21.  Breast cancer scan (mammogram): If you've ever had breasts, begin having regular mammograms starting at age 40. This is a scan to check for breast cancer.  Colon cancer screening: It is important to start screening for colon cancer at age 45.  Have a colonoscopy test every 10 years (or more often if you're at risk) Or, ask your provider about stool tests like a FIT test every year or Cologuard test every 3 years.  To learn more about your testing options, visit:   .  For help making a decision, visit:   https://bit.ly/tn76643.  Prostate cancer screening test: If you have a prostate, ask your care team if a prostate cancer screening test (PSA) at age 55 is right for you.  Lung cancer screening: If you are a current or former smoker ages 50 to 80, ask your care team if ongoing lung cancer screenings are right for you.  For informational purposes only. Not to replace the advice of your health care provider. Copyright   2023 [employer]. All rights reserved. Clinically reviewed by the [facility] Transitions Program. [address] [id number] - REV 01/24.

## 2024-10-11 ENCOUNTER — MYC MEDICAL ADVICE (OUTPATIENT)
Dept: FAMILY MEDICINE | Facility: CLINIC | Age: 24
End: 2024-10-11
Payer: COMMERCIAL

## 2024-10-11 DIAGNOSIS — Z30.011 ENCOUNTER FOR PRESCRIPTION OF ORAL CONTRACEPTIVES: ICD-10-CM

## 2024-10-11 DIAGNOSIS — Z00.00 ROUTINE GENERAL MEDICAL EXAMINATION AT A HEALTH CARE FACILITY: ICD-10-CM

## 2024-10-11 NOTE — TELEPHONE ENCOUNTER
Please send additional script for birth control, pt is out of town and does not have med. Pended pharmacy.

## 2024-10-13 RX ORDER — NORGESTIMATE AND ETHINYL ESTRADIOL 7DAYSX3 LO
1 KIT ORAL DAILY
Qty: 84 TABLET | Refills: 0 | Status: SHIPPED | OUTPATIENT
Start: 2024-10-13

## 2024-10-16 ENCOUNTER — TRANSFERRED RECORDS (OUTPATIENT)
Dept: HEALTH INFORMATION MANAGEMENT | Facility: CLINIC | Age: 24
End: 2024-10-16
Payer: COMMERCIAL

## 2024-11-12 ENCOUNTER — MYC MEDICAL ADVICE (OUTPATIENT)
Dept: FAMILY MEDICINE | Facility: CLINIC | Age: 24
End: 2024-11-12
Payer: COMMERCIAL

## 2024-11-12 DIAGNOSIS — Z30.011 ENCOUNTER FOR PRESCRIPTION OF ORAL CONTRACEPTIVES: ICD-10-CM

## 2024-11-12 DIAGNOSIS — Z00.00 ROUTINE GENERAL MEDICAL EXAMINATION AT A HEALTH CARE FACILITY: ICD-10-CM

## 2024-11-14 RX ORDER — NORGESTIMATE AND ETHINYL ESTRADIOL 7DAYSX3 LO
1 KIT ORAL DAILY
Qty: 84 TABLET | Refills: 2 | Status: SHIPPED | OUTPATIENT
Start: 2024-11-14 | End: 2024-11-15

## 2024-11-15 RX ORDER — NORGESTIMATE AND ETHINYL ESTRADIOL 7DAYSX3 LO
1 KIT ORAL DAILY
Qty: 84 TABLET | Refills: 2 | Status: SHIPPED | OUTPATIENT
Start: 2024-11-15

## 2025-01-15 ENCOUNTER — TRANSFERRED RECORDS (OUTPATIENT)
Dept: HEALTH INFORMATION MANAGEMENT | Facility: CLINIC | Age: 25
End: 2025-01-15
Payer: COMMERCIAL

## 2025-01-21 ENCOUNTER — PATIENT OUTREACH (OUTPATIENT)
Dept: GASTROENTEROLOGY | Facility: CLINIC | Age: 25
End: 2025-01-21
Payer: COMMERCIAL

## 2025-01-21 PROBLEM — D12.6 ADENOMATOUS POLYP OF COLON: Status: ACTIVE | Noted: 2025-01-21

## 2025-07-21 ENCOUNTER — PATIENT OUTREACH (OUTPATIENT)
Dept: CARE COORDINATION | Facility: CLINIC | Age: 25
End: 2025-07-21
Payer: COMMERCIAL

## 2025-08-04 ENCOUNTER — PATIENT OUTREACH (OUTPATIENT)
Dept: CARE COORDINATION | Facility: CLINIC | Age: 25
End: 2025-08-04
Payer: COMMERCIAL

## 2025-09-03 ENCOUNTER — OFFICE VISIT (OUTPATIENT)
Dept: FAMILY MEDICINE | Facility: CLINIC | Age: 25
End: 2025-09-03
Attending: INTERNAL MEDICINE
Payer: COMMERCIAL

## 2025-09-03 VITALS
HEART RATE: 68 BPM | DIASTOLIC BLOOD PRESSURE: 71 MMHG | WEIGHT: 110 LBS | SYSTOLIC BLOOD PRESSURE: 103 MMHG | RESPIRATION RATE: 16 BRPM | BODY MASS INDEX: 19.49 KG/M2 | HEIGHT: 63 IN | OXYGEN SATURATION: 96 %

## 2025-09-03 DIAGNOSIS — Z30.011 ENCOUNTER FOR PRESCRIPTION OF ORAL CONTRACEPTIVES: ICD-10-CM

## 2025-09-03 DIAGNOSIS — K58.0 IRRITABLE BOWEL SYNDROME WITH DIARRHEA: ICD-10-CM

## 2025-09-03 DIAGNOSIS — E80.6 HYPERBILIRUBINEMIA: ICD-10-CM

## 2025-09-03 DIAGNOSIS — Z00.00 ROUTINE GENERAL MEDICAL EXAMINATION AT A HEALTH CARE FACILITY: Primary | ICD-10-CM

## 2025-09-03 DIAGNOSIS — Z83.49 FH: THYROID DISEASE: ICD-10-CM

## 2025-09-03 DIAGNOSIS — Z13.220 ENCOUNTER FOR SCREENING FOR LIPID DISORDER: ICD-10-CM

## 2025-09-03 PROCEDURE — 36415 COLL VENOUS BLD VENIPUNCTURE: CPT | Performed by: INTERNAL MEDICINE

## 2025-09-03 RX ORDER — NORGESTIMATE AND ETHINYL ESTRADIOL 7DAYSX3 LO
1 KIT ORAL DAILY
Qty: 84 TABLET | Refills: 3 | Status: SHIPPED | OUTPATIENT
Start: 2025-09-03

## 2025-09-03 SDOH — HEALTH STABILITY: PHYSICAL HEALTH: ON AVERAGE, HOW MANY MINUTES DO YOU ENGAGE IN EXERCISE AT THIS LEVEL?: 0 MIN

## 2025-09-03 SDOH — HEALTH STABILITY: PHYSICAL HEALTH: ON AVERAGE, HOW MANY DAYS PER WEEK DO YOU ENGAGE IN MODERATE TO STRENUOUS EXERCISE (LIKE A BRISK WALK)?: 0 DAYS

## 2025-09-03 ASSESSMENT — PAIN SCALES - GENERAL: PAINLEVEL_OUTOF10: NO PAIN (0)

## 2025-09-04 LAB
CHOLEST SERPL-MCNC: 191 MG/DL
FASTING STATUS PATIENT QL REPORTED: NO
HDLC SERPL-MCNC: 55 MG/DL
LDLC SERPL CALC-MCNC: 110 MG/DL
NONHDLC SERPL-MCNC: 136 MG/DL
TRIGL SERPL-MCNC: 132 MG/DL
TSH SERPL DL<=0.005 MIU/L-ACNC: 0.91 UIU/ML (ref 0.3–4.2)